# Patient Record
Sex: MALE | Race: WHITE | NOT HISPANIC OR LATINO
[De-identification: names, ages, dates, MRNs, and addresses within clinical notes are randomized per-mention and may not be internally consistent; named-entity substitution may affect disease eponyms.]

---

## 2016-01-08 RX ORDER — ATORVASTATIN CALCIUM 80 MG/1
1 TABLET, FILM COATED ORAL
Qty: 0 | Refills: 0 | COMMUNITY
Start: 2016-01-08

## 2018-07-24 ENCOUNTER — APPOINTMENT (OUTPATIENT)
Dept: ORTHOPEDIC SURGERY | Facility: CLINIC | Age: 83
End: 2018-07-24
Payer: COMMERCIAL

## 2018-07-24 VITALS
DIASTOLIC BLOOD PRESSURE: 79 MMHG | HEART RATE: 64 BPM | SYSTOLIC BLOOD PRESSURE: 159 MMHG | HEIGHT: 72 IN | WEIGHT: 206 LBS | BODY MASS INDEX: 27.9 KG/M2

## 2018-07-24 DIAGNOSIS — M17.11 UNILATERAL PRIMARY OSTEOARTHRITIS, RIGHT KNEE: ICD-10-CM

## 2018-07-24 DIAGNOSIS — M17.12 UNILATERAL PRIMARY OSTEOARTHRITIS, LEFT KNEE: ICD-10-CM

## 2018-07-24 PROCEDURE — 73564 X-RAY EXAM KNEE 4 OR MORE: CPT | Mod: 50

## 2018-07-24 PROCEDURE — 99204 OFFICE O/P NEW MOD 45 MIN: CPT

## 2018-08-01 ENCOUNTER — OUTPATIENT (OUTPATIENT)
Dept: OUTPATIENT SERVICES | Facility: HOSPITAL | Age: 83
LOS: 1 days | End: 2018-08-01
Payer: COMMERCIAL

## 2018-08-01 VITALS
WEIGHT: 200.18 LBS | TEMPERATURE: 98 F | OXYGEN SATURATION: 100 % | HEART RATE: 45 BPM | RESPIRATION RATE: 16 BRPM | DIASTOLIC BLOOD PRESSURE: 62 MMHG | SYSTOLIC BLOOD PRESSURE: 162 MMHG | HEIGHT: 70.75 IN

## 2018-08-01 DIAGNOSIS — Z95.5 PRESENCE OF CORONARY ANGIOPLASTY IMPLANT AND GRAFT: Chronic | ICD-10-CM

## 2018-08-01 DIAGNOSIS — Z98.42 CATARACT EXTRACTION STATUS, LEFT EYE: Chronic | ICD-10-CM

## 2018-08-01 DIAGNOSIS — Z98.890 OTHER SPECIFIED POSTPROCEDURAL STATES: Chronic | ICD-10-CM

## 2018-08-01 DIAGNOSIS — I25.10 ATHEROSCLEROTIC HEART DISEASE OF NATIVE CORONARY ARTERY WITHOUT ANGINA PECTORIS: ICD-10-CM

## 2018-08-01 DIAGNOSIS — M19.90 UNSPECIFIED OSTEOARTHRITIS, UNSPECIFIED SITE: ICD-10-CM

## 2018-08-01 DIAGNOSIS — M17.12 UNILATERAL PRIMARY OSTEOARTHRITIS, LEFT KNEE: ICD-10-CM

## 2018-08-01 DIAGNOSIS — Z98.41 CATARACT EXTRACTION STATUS, RIGHT EYE: Chronic | ICD-10-CM

## 2018-08-01 DIAGNOSIS — Z01.818 ENCOUNTER FOR OTHER PREPROCEDURAL EXAMINATION: ICD-10-CM

## 2018-08-01 LAB
ALBUMIN SERPL ELPH-MCNC: 3.8 G/DL — SIGNIFICANT CHANGE UP (ref 3.3–5)
ALP SERPL-CCNC: 67 U/L — SIGNIFICANT CHANGE UP (ref 30–120)
ALT FLD-CCNC: 19 U/L DA — SIGNIFICANT CHANGE UP (ref 10–60)
ANION GAP SERPL CALC-SCNC: 8 MMOL/L — SIGNIFICANT CHANGE UP (ref 5–17)
APTT BLD: 35 SEC — SIGNIFICANT CHANGE UP (ref 27.5–37.4)
AST SERPL-CCNC: 22 U/L — SIGNIFICANT CHANGE UP (ref 10–40)
BILIRUB SERPL-MCNC: 0.6 MG/DL — SIGNIFICANT CHANGE UP (ref 0.2–1.2)
BLD GP AB SCN SERPL QL: SIGNIFICANT CHANGE UP
BUN SERPL-MCNC: 25 MG/DL — HIGH (ref 7–23)
CALCIUM SERPL-MCNC: 9.2 MG/DL — SIGNIFICANT CHANGE UP (ref 8.4–10.5)
CHLORIDE SERPL-SCNC: 104 MMOL/L — SIGNIFICANT CHANGE UP (ref 96–108)
CO2 SERPL-SCNC: 28 MMOL/L — SIGNIFICANT CHANGE UP (ref 22–31)
CREAT SERPL-MCNC: 1.64 MG/DL — HIGH (ref 0.5–1.3)
GLUCOSE SERPL-MCNC: 98 MG/DL — SIGNIFICANT CHANGE UP (ref 70–99)
HCT VFR BLD CALC: 31.9 % — LOW (ref 39–50)
HGB BLD-MCNC: 11.1 G/DL — LOW (ref 13–17)
INR BLD: 1.09 RATIO — SIGNIFICANT CHANGE UP (ref 0.88–1.16)
MCHC RBC-ENTMCNC: 33 PG — SIGNIFICANT CHANGE UP (ref 27–34)
MCHC RBC-ENTMCNC: 34.8 GM/DL — SIGNIFICANT CHANGE UP (ref 32–36)
MCV RBC AUTO: 94.9 FL — SIGNIFICANT CHANGE UP (ref 80–100)
NRBC # BLD: 0 /100 WBCS — SIGNIFICANT CHANGE UP (ref 0–0)
PLATELET # BLD AUTO: 130 K/UL — LOW (ref 150–400)
POTASSIUM SERPL-MCNC: 4.2 MMOL/L — SIGNIFICANT CHANGE UP (ref 3.5–5.3)
POTASSIUM SERPL-SCNC: 4.2 MMOL/L — SIGNIFICANT CHANGE UP (ref 3.5–5.3)
PROT SERPL-MCNC: 7.4 G/DL — SIGNIFICANT CHANGE UP (ref 6–8.3)
PROTHROM AB SERPL-ACNC: 11.9 SEC — SIGNIFICANT CHANGE UP (ref 9.8–12.7)
RBC # BLD: 3.36 M/UL — LOW (ref 4.2–5.8)
RBC # FLD: 12.8 % — SIGNIFICANT CHANGE UP (ref 10.3–14.5)
SODIUM SERPL-SCNC: 140 MMOL/L — SIGNIFICANT CHANGE UP (ref 135–145)
WBC # BLD: 7.21 K/UL — SIGNIFICANT CHANGE UP (ref 3.8–10.5)
WBC # FLD AUTO: 7.21 K/UL — SIGNIFICANT CHANGE UP (ref 3.8–10.5)

## 2018-08-01 PROCEDURE — 93010 ELECTROCARDIOGRAM REPORT: CPT | Mod: NC

## 2018-08-01 RX ORDER — ASPIRIN/CALCIUM CARB/MAGNESIUM 324 MG
1 TABLET ORAL
Qty: 0 | Refills: 0 | COMMUNITY

## 2018-08-01 NOTE — H&P PST ADULT - NSANTHOSAYNRD_GEN_A_CORE
No. HEATHER screening performed.  STOP BANG Legend: 0-2 = LOW Risk; 3-4 = INTERMEDIATE Risk; 5-8 = HIGH Risk/neck No. HEATHER screening performed.  STOP BANG Legend: 0-2 = LOW Risk; 3-4 = INTERMEDIATE Risk; 5-8 = HIGH Risk/neck 15 inches

## 2018-08-01 NOTE — H&P PST ADULT - PMH
BPH (benign prostatic hyperplasia)    CAD (coronary artery disease)  slight MI x2 as per pt  Essential hypertension    Glaucoma  right eye, no eye drops, only sees minimal shadows in the eye  Hyperlipidemia, unspecified hyperlipidemia    Legally blind in right eye, as defined in [place]    Nocturia  x1  Osteoarthritis    Paroxysmal atrial fibrillation    TIA (transient ischemic attack)  x2 2008 ansd 2011 no residual deficits  Urinary frequency

## 2018-08-01 NOTE — H&P PST ADULT - FAMILY HISTORY
Father  Still living? No  Family history of MI (myocardial infarction), Age at diagnosis: Age Unknown     Mother  Still living? Unknown  Family history of liver cancer, Age at diagnosis: Age Unknown     Sibling  Still living? No  Family history of osteoarthritis, Age at diagnosis: Age Unknown

## 2018-08-01 NOTE — H&P PST ADULT - EXTREMITIES COMMENTS
bilateral lower leg trace swelling right > left, bilateral lower extremity spider veins. calves nontender, faint pedal pulses palpable

## 2018-08-01 NOTE — H&P PST ADULT - HISTORY OF PRESENT ILLNESS
87 yo male presents with long history, 7-8 year history of left knee pain. Pain was treated conservatively with tylenol and NSAIDs. Denies ever receiving PT or any injections into the knee. Pain currently 0-2/10 at rest and and 4/10 with daily activity. Pain at its worst with prolonged standing. Pain mildly relieved with stretching. Denies using any other pain relief measures. Also reports intermittent swelling in the knee

## 2018-08-01 NOTE — H&P PST ADULT - PSH
S/P cataract surgery, right  2010  S/P colonoscopy with polypectomy  2013, benign polyps  S/P coronary artery stent placement  x1 in 2011 S/P cataract surgery, left  2010  S/P cataract surgery, right  2010  S/P colonoscopy with polypectomy  2013, benign polyps  S/P coronary artery stent placement  x1 in 2011

## 2018-08-01 NOTE — H&P PST ADULT - RS GEN PE MLT RESP DETAILS PC
good air movement/airway patent/respirations non-labored/no rales/no rhonchi/no wheezes/breath sounds equal

## 2018-08-01 NOTE — H&P PST ADULT - MUSCULOSKELETAL
details… detailed exam decreased ROM due to pain/diminished strength/no joint erythema/no joint warmth/no joint swelling/no calf tenderness/decreased ROM/right knee

## 2018-08-01 NOTE — H&P PST ADULT - PROBLEM SELECTOR PLAN 1
right TKR. medical clearance requested. toprol and doxazosin AM of surgery with sips of water. pepcid and surgical wash instructions reviewed and verbalized understanding

## 2018-08-02 LAB
MRSA PCR RESULT.: SIGNIFICANT CHANGE UP
S AUREUS DNA NOSE QL NAA+PROBE: SIGNIFICANT CHANGE UP

## 2018-08-13 PROBLEM — M19.90 UNSPECIFIED OSTEOARTHRITIS, UNSPECIFIED SITE: Chronic | Status: ACTIVE | Noted: 2018-08-01

## 2018-08-13 PROBLEM — R35.1 NOCTURIA: Chronic | Status: ACTIVE | Noted: 2018-08-01

## 2018-08-13 PROBLEM — H54.8 LEGAL BLINDNESS, AS DEFINED IN USA: Chronic | Status: ACTIVE | Noted: 2018-08-01

## 2018-08-13 PROBLEM — N40.0 BENIGN PROSTATIC HYPERPLASIA WITHOUT LOWER URINARY TRACT SYMPTOMS: Chronic | Status: ACTIVE | Noted: 2018-08-01

## 2018-08-13 PROBLEM — R35.0 FREQUENCY OF MICTURITION: Chronic | Status: ACTIVE | Noted: 2018-08-01

## 2018-08-16 ENCOUNTER — TRANSCRIPTION ENCOUNTER (OUTPATIENT)
Age: 83
End: 2018-08-16

## 2018-08-16 RX ORDER — SENNA PLUS 8.6 MG/1
2 TABLET ORAL AT BEDTIME
Qty: 0 | Refills: 0 | Status: DISCONTINUED | OUTPATIENT
Start: 2018-08-17 | End: 2018-08-22

## 2018-08-16 RX ORDER — POLYETHYLENE GLYCOL 3350 17 G/17G
17 POWDER, FOR SOLUTION ORAL DAILY
Qty: 0 | Refills: 0 | Status: DISCONTINUED | OUTPATIENT
Start: 2018-08-17 | End: 2018-08-22

## 2018-08-16 RX ORDER — SODIUM CHLORIDE 9 MG/ML
1000 INJECTION, SOLUTION INTRAVENOUS
Qty: 0 | Refills: 0 | Status: DISCONTINUED | OUTPATIENT
Start: 2018-08-17 | End: 2018-08-22

## 2018-08-16 RX ORDER — ONDANSETRON 8 MG/1
4 TABLET, FILM COATED ORAL EVERY 6 HOURS
Qty: 0 | Refills: 0 | Status: DISCONTINUED | OUTPATIENT
Start: 2018-08-17 | End: 2018-08-22

## 2018-08-16 RX ORDER — PANTOPRAZOLE SODIUM 20 MG/1
40 TABLET, DELAYED RELEASE ORAL DAILY
Qty: 0 | Refills: 0 | Status: DISCONTINUED | OUTPATIENT
Start: 2018-08-17 | End: 2018-08-22

## 2018-08-16 RX ORDER — DOCUSATE SODIUM 100 MG
100 CAPSULE ORAL THREE TIMES A DAY
Qty: 0 | Refills: 0 | Status: DISCONTINUED | OUTPATIENT
Start: 2018-08-17 | End: 2018-08-22

## 2018-08-16 RX ORDER — MAGNESIUM HYDROXIDE 400 MG/1
30 TABLET, CHEWABLE ORAL DAILY
Qty: 0 | Refills: 0 | Status: DISCONTINUED | OUTPATIENT
Start: 2018-08-17 | End: 2018-08-22

## 2018-08-16 RX ORDER — FAMOTIDINE 10 MG/ML
1 INJECTION INTRAVENOUS
Qty: 0 | Refills: 0 | COMMUNITY
Start: 2018-08-16 | End: 2018-08-17

## 2018-08-16 NOTE — PROGRESS NOTE ADULT - SUBJECTIVE AND OBJECTIVE BOX
Admission medication reconciliation/Presurgical evaluation:    NKDA    Home Medications:   · 	apixaban 2.5 mg 2 times a day  · 	Atorvastatin (Lipitor) 20 mg once a day (in the evening)  · 	aspirin 81 mg once a day (at bedtime)  · 	doxazosin 1 mg once a day  · 	Olmesartan-HCTZ (Benicar HCT) 40 mg-25 mg once a day (in the evening)  · 	Metoprolol succinate (Toprol-XL) 50 mg once a day     Past Medical History:  BPH (benign prostatic hyperplasia)    CAD (coronary artery disease)  slight MI x2 as per pt  Essential hypertension    Glaucoma  right eye, no eye drops, only sees minimal shadows in the eye  Hyperlipidemia, unspecified hyperlipidemia    Legally blind in right eye    Nocturia  x1  Osteoarthritis    Paroxysmal atrial fibrillation    TIA (transient ischemic attack)  x2 2008 and 2011 no residual deficits  Urinary frequency     Past Surgical History:  S/P cataract surgery, left  2010  S/P cataract surgery, right  2010  S/P colonoscopy with polypectomy  2013, benign polyps  S/P coronary artery stent placement  x1 in 2011    PST values of interest:  SCr 1.64mg/dL (­)  LFTs WNL    Presurgical evaluation:  1.	Topical TXA  2.	Acetaminophen for pain management. No NSAIDs (CKD)  3.	VTE prophylaxis: resume patient’s home medications of Eliquis 2.5mg q12h (AF, but age >80 and SCr = 1.5) and ASA EC 81mg Qday beginning POD1.

## 2018-08-17 ENCOUNTER — INPATIENT (INPATIENT)
Facility: HOSPITAL | Age: 83
LOS: 4 days | Discharge: NURSING, REHAB OR LONG-TERM CARE | DRG: 470 | End: 2018-08-22
Attending: ORTHOPAEDIC SURGERY | Admitting: ORTHOPAEDIC SURGERY
Payer: COMMERCIAL

## 2018-08-17 ENCOUNTER — RESULT REVIEW (OUTPATIENT)
Age: 83
End: 2018-08-17

## 2018-08-17 ENCOUNTER — APPOINTMENT (OUTPATIENT)
Dept: ORTHOPEDIC SURGERY | Facility: HOSPITAL | Age: 83
End: 2018-08-17

## 2018-08-17 VITALS
OXYGEN SATURATION: 97 % | SYSTOLIC BLOOD PRESSURE: 136 MMHG | WEIGHT: 199.74 LBS | DIASTOLIC BLOOD PRESSURE: 80 MMHG | HEIGHT: 72 IN | HEART RATE: 53 BPM | TEMPERATURE: 98 F | RESPIRATION RATE: 12 BRPM

## 2018-08-17 DIAGNOSIS — Z98.41 CATARACT EXTRACTION STATUS, RIGHT EYE: Chronic | ICD-10-CM

## 2018-08-17 DIAGNOSIS — Z95.5 PRESENCE OF CORONARY ANGIOPLASTY IMPLANT AND GRAFT: Chronic | ICD-10-CM

## 2018-08-17 DIAGNOSIS — Z98.890 OTHER SPECIFIED POSTPROCEDURAL STATES: Chronic | ICD-10-CM

## 2018-08-17 DIAGNOSIS — M17.12 UNILATERAL PRIMARY OSTEOARTHRITIS, LEFT KNEE: ICD-10-CM

## 2018-08-17 DIAGNOSIS — Z98.42 CATARACT EXTRACTION STATUS, LEFT EYE: Chronic | ICD-10-CM

## 2018-08-17 DIAGNOSIS — Z01.818 ENCOUNTER FOR OTHER PREPROCEDURAL EXAMINATION: ICD-10-CM

## 2018-08-17 LAB
ABO RH CONFIRMATION: SIGNIFICANT CHANGE UP
ANION GAP SERPL CALC-SCNC: 6 MMOL/L — SIGNIFICANT CHANGE UP (ref 5–17)
BUN SERPL-MCNC: 28 MG/DL — HIGH (ref 7–23)
CALCIUM SERPL-MCNC: 8.6 MG/DL — SIGNIFICANT CHANGE UP (ref 8.4–10.5)
CHLORIDE SERPL-SCNC: 105 MMOL/L — SIGNIFICANT CHANGE UP (ref 96–108)
CO2 SERPL-SCNC: 26 MMOL/L — SIGNIFICANT CHANGE UP (ref 22–31)
CREAT SERPL-MCNC: 1.59 MG/DL — HIGH (ref 0.5–1.3)
GLUCOSE SERPL-MCNC: 126 MG/DL — HIGH (ref 70–99)
HCT VFR BLD CALC: 29 % — LOW (ref 39–50)
HGB BLD-MCNC: 9.9 G/DL — LOW (ref 13–17)
POTASSIUM SERPL-MCNC: 4.3 MMOL/L — SIGNIFICANT CHANGE UP (ref 3.5–5.3)
POTASSIUM SERPL-SCNC: 4.3 MMOL/L — SIGNIFICANT CHANGE UP (ref 3.5–5.3)
SODIUM SERPL-SCNC: 137 MMOL/L — SIGNIFICANT CHANGE UP (ref 135–145)

## 2018-08-17 PROCEDURE — 88311 DECALCIFY TISSUE: CPT | Mod: 26

## 2018-08-17 PROCEDURE — 99223 1ST HOSP IP/OBS HIGH 75: CPT

## 2018-08-17 PROCEDURE — 88305 TISSUE EXAM BY PATHOLOGIST: CPT | Mod: 26

## 2018-08-17 PROCEDURE — 93010 ELECTROCARDIOGRAM REPORT: CPT

## 2018-08-17 PROCEDURE — 73562 X-RAY EXAM OF KNEE 3: CPT | Mod: 26,LT

## 2018-08-17 PROCEDURE — 12345: CPT | Mod: NC

## 2018-08-17 RX ORDER — DOXAZOSIN MESYLATE 4 MG
1 TABLET ORAL
Qty: 0 | Refills: 0 | COMMUNITY

## 2018-08-17 RX ORDER — TRANEXAMIC ACID 100 MG/ML
1 INJECTION, SOLUTION INTRAVENOUS ONCE
Qty: 0 | Refills: 0 | Status: COMPLETED | OUTPATIENT
Start: 2018-08-17 | End: 2018-08-17

## 2018-08-17 RX ORDER — CEFAZOLIN SODIUM 1 G
2000 VIAL (EA) INJECTION ONCE
Qty: 0 | Refills: 0 | Status: COMPLETED | OUTPATIENT
Start: 2018-08-17 | End: 2018-08-17

## 2018-08-17 RX ORDER — ACETAMINOPHEN 500 MG
1000 TABLET ORAL ONCE
Qty: 0 | Refills: 0 | Status: COMPLETED | OUTPATIENT
Start: 2018-08-17 | End: 2018-08-17

## 2018-08-17 RX ORDER — ATORVASTATIN CALCIUM 80 MG/1
20 TABLET, FILM COATED ORAL AT BEDTIME
Qty: 0 | Refills: 0 | Status: DISCONTINUED | OUTPATIENT
Start: 2018-08-17 | End: 2018-08-22

## 2018-08-17 RX ORDER — ASPIRIN/CALCIUM CARB/MAGNESIUM 324 MG
1 TABLET ORAL
Qty: 0 | Refills: 0 | COMMUNITY

## 2018-08-17 RX ORDER — ASPIRIN/CALCIUM CARB/MAGNESIUM 324 MG
81 TABLET ORAL AT BEDTIME
Qty: 0 | Refills: 0 | Status: DISCONTINUED | OUTPATIENT
Start: 2018-08-18 | End: 2018-08-22

## 2018-08-17 RX ORDER — ACETAMINOPHEN 500 MG
1000 TABLET ORAL EVERY 8 HOURS
Qty: 0 | Refills: 0 | Status: COMPLETED | OUTPATIENT
Start: 2018-08-17 | End: 2018-08-18

## 2018-08-17 RX ORDER — CHLORHEXIDINE GLUCONATE 213 G/1000ML
1 SOLUTION TOPICAL ONCE
Qty: 0 | Refills: 0 | Status: COMPLETED | OUTPATIENT
Start: 2018-08-17 | End: 2018-08-17

## 2018-08-17 RX ORDER — APIXABAN 2.5 MG/1
2.5 TABLET, FILM COATED ORAL
Qty: 0 | Refills: 0 | Status: COMPLETED | OUTPATIENT
Start: 2018-08-18 | End: 2018-08-18

## 2018-08-17 RX ORDER — ACETAMINOPHEN 500 MG
1000 TABLET ORAL EVERY 8 HOURS
Qty: 0 | Refills: 0 | Status: DISCONTINUED | OUTPATIENT
Start: 2018-08-18 | End: 2018-08-22

## 2018-08-17 RX ORDER — FENTANYL CITRATE 50 UG/ML
25 INJECTION INTRAVENOUS
Qty: 0 | Refills: 0 | Status: DISCONTINUED | OUTPATIENT
Start: 2018-08-17 | End: 2018-08-17

## 2018-08-17 RX ORDER — APREPITANT 80 MG/1
40 CAPSULE ORAL ONCE
Qty: 0 | Refills: 0 | Status: COMPLETED | OUTPATIENT
Start: 2018-08-17 | End: 2018-08-17

## 2018-08-17 RX ORDER — OXYCODONE HYDROCHLORIDE 5 MG/1
5 TABLET ORAL ONCE
Qty: 0 | Refills: 0 | Status: DISCONTINUED | OUTPATIENT
Start: 2018-08-17 | End: 2018-08-17

## 2018-08-17 RX ORDER — SODIUM CHLORIDE 9 MG/ML
1000 INJECTION, SOLUTION INTRAVENOUS
Qty: 0 | Refills: 0 | Status: DISCONTINUED | OUTPATIENT
Start: 2018-08-17 | End: 2018-08-17

## 2018-08-17 RX ORDER — OXYCODONE HYDROCHLORIDE 5 MG/1
10 TABLET ORAL
Qty: 0 | Refills: 0 | Status: DISCONTINUED | OUTPATIENT
Start: 2018-08-17 | End: 2018-08-22

## 2018-08-17 RX ORDER — APIXABAN 2.5 MG/1
2.5 TABLET, FILM COATED ORAL EVERY 12 HOURS
Qty: 0 | Refills: 0 | Status: DISCONTINUED | OUTPATIENT
Start: 2018-08-19 | End: 2018-08-22

## 2018-08-17 RX ORDER — CEFAZOLIN SODIUM 1 G
2000 VIAL (EA) INJECTION EVERY 8 HOURS
Qty: 0 | Refills: 0 | Status: COMPLETED | OUTPATIENT
Start: 2018-08-17 | End: 2018-08-18

## 2018-08-17 RX ORDER — DOXAZOSIN MESYLATE 4 MG
1 TABLET ORAL AT BEDTIME
Qty: 0 | Refills: 0 | Status: DISCONTINUED | OUTPATIENT
Start: 2018-08-17 | End: 2018-08-22

## 2018-08-17 RX ORDER — OXYCODONE HYDROCHLORIDE 5 MG/1
5 TABLET ORAL
Qty: 0 | Refills: 0 | Status: DISCONTINUED | OUTPATIENT
Start: 2018-08-17 | End: 2018-08-22

## 2018-08-17 RX ORDER — LOSARTAN POTASSIUM 100 MG/1
100 TABLET, FILM COATED ORAL DAILY
Qty: 0 | Refills: 0 | Status: DISCONTINUED | OUTPATIENT
Start: 2018-08-19 | End: 2018-08-19

## 2018-08-17 RX ORDER — METOPROLOL TARTRATE 50 MG
50 TABLET ORAL DAILY
Qty: 0 | Refills: 0 | Status: DISCONTINUED | OUTPATIENT
Start: 2018-08-17 | End: 2018-08-22

## 2018-08-17 RX ORDER — ONDANSETRON 8 MG/1
4 TABLET, FILM COATED ORAL ONCE
Qty: 0 | Refills: 0 | Status: DISCONTINUED | OUTPATIENT
Start: 2018-08-17 | End: 2018-08-17

## 2018-08-17 RX ORDER — HYDROMORPHONE HYDROCHLORIDE 2 MG/ML
0.5 INJECTION INTRAMUSCULAR; INTRAVENOUS; SUBCUTANEOUS
Qty: 0 | Refills: 0 | Status: DISCONTINUED | OUTPATIENT
Start: 2018-08-17 | End: 2018-08-22

## 2018-08-17 RX ADMIN — APREPITANT 40 MILLIGRAM(S): 80 CAPSULE ORAL at 09:14

## 2018-08-17 RX ADMIN — ATORVASTATIN CALCIUM 20 MILLIGRAM(S): 80 TABLET, FILM COATED ORAL at 21:26

## 2018-08-17 RX ADMIN — Medication 400 MILLIGRAM(S): at 18:52

## 2018-08-17 RX ADMIN — Medication 100 MILLIGRAM(S): at 19:30

## 2018-08-17 RX ADMIN — SENNA PLUS 2 TABLET(S): 8.6 TABLET ORAL at 21:26

## 2018-08-17 RX ADMIN — Medication 100 MILLIGRAM(S): at 21:26

## 2018-08-17 RX ADMIN — SODIUM CHLORIDE 60 MILLILITER(S): 9 INJECTION, SOLUTION INTRAVENOUS at 17:15

## 2018-08-17 RX ADMIN — CHLORHEXIDINE GLUCONATE 1 APPLICATION(S): 213 SOLUTION TOPICAL at 09:14

## 2018-08-17 RX ADMIN — Medication 1 MILLIGRAM(S): at 21:26

## 2018-08-17 NOTE — CONSULT NOTE ADULT - ASSESSMENT
87 yo male with OA, BPH, CAD, Paroxysmal Afib,  presents with long history, 7-8 year history of left knee pain.    S/p left TKR, stable cont pain control with IV Dilaudid and po Oxycodone prn , cont PT and DVT ppx with Eliquis    HTN ,controlled cont metoprolol with hold parameter and Losartan at 8/19.    BPH cont cardura and monitor urine output, high risk for post op urinary retention.     CAD stable ,cont ASA and metoprolol and statin.     PAF , stable cont metoprolol and Eliquis.    HLD cont statin 87 yo male with OA, BPH, CAD, Paroxysmal Afib,  presents with long history, 7-8 year history of left knee pain.    S/p left TKR, stable cont pain control with IV Dilaudid and po Oxycodone prn , cont PT and DVT ppx with Eliquis    HTN ,controlled cont metoprolol with hold parameter and Losartan at 8/19.    BPH cont cardura and monitor urine output, high risk for post op urinary retention.     CAD stable ,cont ASA and metoprolol and statin.     PAF , stable cont metoprolol and Eliquis.    HLD cont statin    Hx of TIA cont ASA, Statin.

## 2018-08-17 NOTE — PHYSICAL THERAPY INITIAL EVALUATION ADULT - IMPAIRED TRANSFERS: SIT/STAND, REHAB EVAL
Pt with decreased ability to flex right knee which is the non-operated leg/decreased strength/impaired balance

## 2018-08-17 NOTE — ASU PATIENT PROFILE, ADULT - PSH
S/P cataract surgery, left  2010  S/P cataract surgery, right  2010  S/P colonoscopy with polypectomy  2013, benign polyps  S/P coronary artery stent placement  x1 in 2011

## 2018-08-17 NOTE — BRIEF OPERATIVE NOTE - PROCEDURE
<<-----Click on this checkbox to enter Procedure Knee replacement, total, left  08/17/2018    Active  [id number]

## 2018-08-17 NOTE — PHYSICAL THERAPY INITIAL EVALUATION ADULT - RANGE OF MOTION EXAMINATION, REHAB EVAL
left knee flex ~50 degrees; right knee flex ~90 degrees/deficits as listed below/bilateral upper extremity ROM was WFL (within functional limits)

## 2018-08-17 NOTE — PROGRESS NOTE ADULT - SUBJECTIVE AND OBJECTIVE BOX
Orthopaedic Post Op Note    Procedure: Left TKR  Surgeon: Que Burgos    86y Male comfortable, without complaints. Reported pain score = 0  Denies N/V, CP, SOB, numbness/tingling of extremities.    PE:  Vital Signs Last 24 Hrs  T(C): 37.1 (17 Aug 2018 19:45), Max: 37.1 (17 Aug 2018 19:45)  T(F): 98.7 (17 Aug 2018 19:45), Max: 98.7 (17 Aug 2018 19:45)  HR: 72 (17 Aug 2018 19:45) (50 - 72)  BP: 128/71 (17 Aug 2018 19:45) (101/38 - 143/62)  RR: 17 (17 Aug 2018 19:45) (10 - 18)  SpO2: 100% (17 Aug 2018 19:45) (96% - 100%)  General: Pt alert and oriented   Lungs: + BS CTA bilaterally  Heart: +S1 & S2 heard, RRR  Abd: + BS heard, soft, NT, ND  Left Knee Dressing: C/D/I, functioning hemovac in place  Bilateral LEs:  Motor:   5/5 dorsiflexion, plantarflexion, EHL  Sensation intact to LT   2+ PT Pulses    Post Op labs:  17 Aug 2018 16:38    137    |  105    |  28     ----------------------------<  126    4.3     |  26     |  1.59     Ca    8.6        17 Aug 2018 16:38                            9.9    x     )-----------( x        ( 17 Aug 2018 16:38 )             29.0       A/P: 86y Male POD#0 s/p Left TKR  - Stable  - Acetaminophen, dilaudid/oxycodone for Pain Control   - DVT ppx: Eliquis and Aspirin  - Michaela op IV abx: Ancef  - PT, OT per protocol  - F/U AM Labs  DCP = home Monday

## 2018-08-17 NOTE — PHYSICAL THERAPY INITIAL EVALUATION ADULT - ADDITIONAL COMMENTS
normal...
Lives in house with spouse.  4 stairs to enter with railing; 10 stairs to bedroom with railing but can stay on 1st level.  Pt. has stall shower with curtains and grab bars.  Owns cane.

## 2018-08-18 LAB
ANION GAP SERPL CALC-SCNC: 7 MMOL/L — SIGNIFICANT CHANGE UP (ref 5–17)
BUN SERPL-MCNC: 32 MG/DL — HIGH (ref 7–23)
CALCIUM SERPL-MCNC: 8.2 MG/DL — LOW (ref 8.4–10.5)
CHLORIDE SERPL-SCNC: 104 MMOL/L — SIGNIFICANT CHANGE UP (ref 96–108)
CO2 SERPL-SCNC: 24 MMOL/L — SIGNIFICANT CHANGE UP (ref 22–31)
CREAT SERPL-MCNC: 1.61 MG/DL — HIGH (ref 0.5–1.3)
GLUCOSE BLDC GLUCOMTR-MCNC: 253 MG/DL — HIGH (ref 70–99)
GLUCOSE SERPL-MCNC: 170 MG/DL — HIGH (ref 70–99)
HCT VFR BLD CALC: 21.8 % — LOW (ref 39–50)
HCT VFR BLD CALC: 25.3 % — LOW (ref 39–50)
HGB BLD-MCNC: 7.6 G/DL — LOW (ref 13–17)
HGB BLD-MCNC: 8.8 G/DL — LOW (ref 13–17)
MCHC RBC-ENTMCNC: 33.3 PG — SIGNIFICANT CHANGE UP (ref 27–34)
MCHC RBC-ENTMCNC: 33.7 PG — SIGNIFICANT CHANGE UP (ref 27–34)
MCHC RBC-ENTMCNC: 34.8 GM/DL — SIGNIFICANT CHANGE UP (ref 32–36)
MCHC RBC-ENTMCNC: 34.9 GM/DL — SIGNIFICANT CHANGE UP (ref 32–36)
MCV RBC AUTO: 95.6 FL — SIGNIFICANT CHANGE UP (ref 80–100)
MCV RBC AUTO: 96.9 FL — SIGNIFICANT CHANGE UP (ref 80–100)
NRBC # BLD: 0 /100 WBCS — SIGNIFICANT CHANGE UP (ref 0–0)
NRBC # BLD: 0 /100 WBCS — SIGNIFICANT CHANGE UP (ref 0–0)
PLATELET # BLD AUTO: 118 K/UL — LOW (ref 150–400)
PLATELET # BLD AUTO: 128 K/UL — LOW (ref 150–400)
POTASSIUM SERPL-MCNC: 4.5 MMOL/L — SIGNIFICANT CHANGE UP (ref 3.5–5.3)
POTASSIUM SERPL-SCNC: 4.5 MMOL/L — SIGNIFICANT CHANGE UP (ref 3.5–5.3)
RBC # BLD: 2.28 M/UL — LOW (ref 4.2–5.8)
RBC # BLD: 2.61 M/UL — LOW (ref 4.2–5.8)
RBC # FLD: 12.9 % — SIGNIFICANT CHANGE UP (ref 10.3–14.5)
RBC # FLD: 12.9 % — SIGNIFICANT CHANGE UP (ref 10.3–14.5)
SODIUM SERPL-SCNC: 135 MMOL/L — SIGNIFICANT CHANGE UP (ref 135–145)
WBC # BLD: 12.86 K/UL — HIGH (ref 3.8–10.5)
WBC # BLD: 9.56 K/UL — SIGNIFICANT CHANGE UP (ref 3.8–10.5)
WBC # FLD AUTO: 12.86 K/UL — HIGH (ref 3.8–10.5)
WBC # FLD AUTO: 9.56 K/UL — SIGNIFICANT CHANGE UP (ref 3.8–10.5)

## 2018-08-18 PROCEDURE — 12345: CPT | Mod: NC

## 2018-08-18 PROCEDURE — 99233 SBSQ HOSP IP/OBS HIGH 50: CPT

## 2018-08-18 RX ORDER — SODIUM CHLORIDE 9 MG/ML
1000 INJECTION INTRAMUSCULAR; INTRAVENOUS; SUBCUTANEOUS ONCE
Qty: 0 | Refills: 0 | Status: COMPLETED | OUTPATIENT
Start: 2018-08-18 | End: 2018-08-18

## 2018-08-18 RX ORDER — SODIUM CHLORIDE 9 MG/ML
1000 INJECTION INTRAMUSCULAR; INTRAVENOUS; SUBCUTANEOUS ONCE
Qty: 0 | Refills: 0 | Status: DISCONTINUED | OUTPATIENT
Start: 2018-08-18 | End: 2018-08-18

## 2018-08-18 RX ADMIN — SODIUM CHLORIDE 75 MILLILITER(S): 9 INJECTION, SOLUTION INTRAVENOUS at 03:06

## 2018-08-18 RX ADMIN — Medication 1000 MILLIGRAM(S): at 12:43

## 2018-08-18 RX ADMIN — Medication 1 MILLIGRAM(S): at 21:36

## 2018-08-18 RX ADMIN — APIXABAN 2.5 MILLIGRAM(S): 2.5 TABLET, FILM COATED ORAL at 22:53

## 2018-08-18 RX ADMIN — Medication 81 MILLIGRAM(S): at 21:36

## 2018-08-18 RX ADMIN — Medication 100 MILLIGRAM(S): at 03:31

## 2018-08-18 RX ADMIN — APIXABAN 2.5 MILLIGRAM(S): 2.5 TABLET, FILM COATED ORAL at 12:43

## 2018-08-18 RX ADMIN — Medication 1000 MILLIGRAM(S): at 21:42

## 2018-08-18 RX ADMIN — Medication 1000 MILLIGRAM(S): at 12:44

## 2018-08-18 RX ADMIN — SODIUM CHLORIDE 2000 MILLILITER(S): 9 INJECTION INTRAMUSCULAR; INTRAVENOUS; SUBCUTANEOUS at 01:54

## 2018-08-18 RX ADMIN — Medication 100 MILLIGRAM(S): at 05:48

## 2018-08-18 RX ADMIN — Medication 1000 MILLIGRAM(S): at 11:26

## 2018-08-18 RX ADMIN — ATORVASTATIN CALCIUM 20 MILLIGRAM(S): 80 TABLET, FILM COATED ORAL at 21:36

## 2018-08-18 RX ADMIN — Medication 400 MILLIGRAM(S): at 03:08

## 2018-08-18 RX ADMIN — PANTOPRAZOLE SODIUM 40 MILLIGRAM(S): 20 TABLET, DELAYED RELEASE ORAL at 12:43

## 2018-08-18 RX ADMIN — Medication 1000 MILLIGRAM(S): at 21:41

## 2018-08-18 RX ADMIN — SODIUM CHLORIDE 1000 MILLILITER(S): 9 INJECTION INTRAMUSCULAR; INTRAVENOUS; SUBCUTANEOUS at 03:00

## 2018-08-18 RX ADMIN — Medication 400 MILLIGRAM(S): at 11:26

## 2018-08-18 RX ADMIN — Medication 100 MILLIGRAM(S): at 21:42

## 2018-08-18 RX ADMIN — Medication 100 MILLIGRAM(S): at 12:43

## 2018-08-18 RX ADMIN — SENNA PLUS 2 TABLET(S): 8.6 TABLET ORAL at 21:42

## 2018-08-18 NOTE — PROGRESS NOTE ADULT - SUBJECTIVE AND OBJECTIVE BOX
Patient is a 86y old  Male who presents with a chief complaint of heart tremors (17 Aug 2018 19:50)      INTERVAL HPI/OVERNIGHT EVENTS: pt has drop in Hb/Hct , pt is asymptomatic     MEDICATIONS  (STANDING):  acetaminophen   Tablet. 1000 milliGRAM(s) Oral every 8 hours  apixaban 2.5 milliGRAM(s) Oral <User Schedule>  aspirin enteric coated 81 milliGRAM(s) Oral at bedtime  atorvastatin 20 milliGRAM(s) Oral at bedtime  docusate sodium 100 milliGRAM(s) Oral three times a day  doxazosin 1 milliGRAM(s) Oral at bedtime  lactated ringers. 1000 milliLiter(s) (75 mL/Hr) IV Continuous <Continuous>  metoprolol succinate ER 50 milliGRAM(s) Oral daily  pantoprazole    Tablet 40 milliGRAM(s) Oral daily  senna 2 Tablet(s) Oral at bedtime    MEDICATIONS  (PRN):  aluminum hydroxide/magnesium hydroxide/simethicone Suspension 30 milliLiter(s) Oral four times a day PRN Indigestion  HYDROmorphone  Injectable 0.5 milliGRAM(s) IV Push every 3 hours PRN Severe Pain (7 - 10)  magnesium hydroxide Suspension 30 milliLiter(s) Oral daily PRN Constipation  ondansetron Injectable 4 milliGRAM(s) IV Push every 6 hours PRN Nausea and/or Vomiting  oxyCODONE    IR 5 milliGRAM(s) Oral every 3 hours PRN Mild Pain (1 - 3)  oxyCODONE    IR 10 milliGRAM(s) Oral every 3 hours PRN Moderate Pain (4 - 6)  polyethylene glycol 3350 17 Gram(s) Oral daily PRN Constipation      Allergies    No Known Allergies    Intolerances        REVIEW OF SYSTEMS:  CONSTITUTIONAL: No fever or chills  HEENT:  No headache, no sore throat  RESPIRATORY: No cough, wheezing, or shortness of breath  CARDIOVASCULAR: No chest pain, palpitations, or leg swelling  GASTROINTESTINAL: No nausea, vomiting, or diarrhea  GENITOURINARY: No dysuria, frequency, or hematuria  NEUROLOGICAL: no focal weakness or dizziness  SKIN:  No rashes or lesions   MUSCULOSKELETAL: no myalgias   PSYCHIATRIC: No depression or anxiety  ROS Unable to obtain due to - [ ] Dementia  [ ] Lethargy  REST OF REVIEW Of SYSTEM - [ ] Normal     Vital Signs Last 24 Hrs  T(C): 36.5 (18 Aug 2018 11:24), Max: 37.1 (17 Aug 2018 19:45)  T(F): 97.7 (18 Aug 2018 11:24), Max: 98.7 (17 Aug 2018 19:45)  HR: 58 (18 Aug 2018 11:24) (47 - 72)  BP: 123/58 (18 Aug 2018 11:24) (95/59 - 155/62)  BP(mean): --  RR: 16 (18 Aug 2018 11:24) (10 - 18)  SpO2: 100% (18 Aug 2018 11:24) (96% - 100%)    PHYSICAL EXAM:  GENERAL: NAD  HEENT:  EOMI, mmm  CHEST/LUNG:  CTA b/l, no rales, wheezes, or rhonchi  HEART:  RRR, S1, S2, []murmur  ABDOMEN:  BS+, soft, NT, ND  EXTREMITIES: no edema or calf tenderness , dressing dry and intact.   NERVOUS SYSTEM: AA&Ox3 , no focal neurological deficit.     DIET:     Cultures:     LABS:                        7.6    9.56  )-----------( 118      ( 18 Aug 2018 06:42 )             21.8     CBC Full  -  ( 18 Aug 2018 06:42 )  WBC Count : 9.56 K/uL  Hemoglobin : 7.6 g/dL  Hematocrit : 21.8 %  Platelet Count - Automated : 118 K/uL  Mean Cell Volume : 95.6 fl  Mean Cell Hemoglobin : 33.3 pg  Mean Cell Hemoglobin Concentration : 34.9 gm/dL  Auto Neutrophil # : x  Auto Lymphocyte # : x  Auto Monocyte # : x  Auto Eosinophil # : x  Auto Basophil # : x  Auto Neutrophil % : x  Auto Lymphocyte % : x  Auto Monocyte % : x  Auto Eosinophil % : x  Auto Basophil % : x    18 Aug 2018 06:42    135    |  104    |  32     ----------------------------<  170    4.5     |  24     |  1.61     Ca    8.2        18 Aug 2018 06:42          CAPILLARY BLOOD GLUCOSE      POCT Blood Glucose.: 253 mg/dL (18 Aug 2018 00:50)          RADIOLOGY & ADDITIONAL TESTS:    Personally reviewed.     Consultant(s) Notes Reviewed:  [x] YES  [ ] NO    Care Discussed with [ ] Consultants  [x] Patient  [ ] Family  [x]      [ ] Other; RN  DVT ppx  Advanced directive

## 2018-08-18 NOTE — OCCUPATIONAL THERAPY INITIAL EVALUATION ADULT - ADDITIONAL COMMENTS
Lives with wife. 4 steps to enter with HR. 10 steps to basement but doesn't have to do initially.  Stall shower

## 2018-08-18 NOTE — PROGRESS NOTE ADULT - SUBJECTIVE AND OBJECTIVE BOX
ORTHOPEDIC PA PROGRESS NOTE  JOSEPH LUEVANO      86y Male                                                                                                                               POD # 1       STATUS POST:               Pre-Op Dx: Primary osteoarthritis of left knee    Post-Op Dx:  Primary osteoarthritis of left knee    Procedure: Knee replacement, total, left                                              Patient comfortable denies shortness of breath chest pain heart palpations dizziness constitutional symptoms ect.  His pain is well controlled with current pain meds.  He did have low urine ouput fluid bolus total of 2 liters was given patient was assymptomatic.  Bladder scan showed 700cc patient tried to stand and void and had vasovagal episode and rapid response called.  Menezes was placed he has history of BPH and nocturia.  He is comfortable in bed at this time.  Hemovac was clamped for four hours for 400cc output then reclamped at 1:30am after 150cc output.      Pain (0-10):   Current Pain Management:  [ ] PCA   [x ] Po Analgesics [x ] IM /IV Anagesics     T(F): 97.6  HR: 54  BP: 132/54  RR: 17  SpO2: 100%                        7.6    9.56  )-----------( 118      ( 18 Aug 2018 06:42 )             21.8                     08-18    135  |  104  |  32<H>  ----------------------------<  170<H>  4.5   |  24  |  1.61<H>    Ca    8.2<L>      18 Aug 2018 06:42    Hemovac out put  Physical Exam :    -   Surgical site clean and dry hemovac clamped in place.   -   Distal Neurvascular status intact grossly.   -   Warm well perfused; capillary refill <3 seconds   -   (+)EHL/FHL 5/5 dorsi/plantar flexion intact  -   (+) Sensation to light touch  -   (-) Calf tenderness Bilaterally    A/P: 86y Male s/p Primary osteoarthritis of left knee    -   Acute blood loss anemia with vasovagal episode yesterday.  Vitals stable in bed discussed with Medicine will transfuse 1 unit PRBC and monitor recheck cbc in am  -  Continue menezes monitory urine out put  -  Will unclamp hemovac and monitor output  -   Pain control   -   Medicine to follow  -   DVT ppx:     [x ]SCDs     [x ] ASA     [x ] Eliquis     [ ] Lovenox  -   Weight bearing status:  WBAT [x ]        PWB    [ ]     TTWB  [ ]      NWB  [ ]  -   Nurse made aware of plan   -  Dispo:     Home [ ]     Acute Rehab [ ]     [facility] [ ]     TBD [ x]

## 2018-08-18 NOTE — CHART NOTE - NSCHARTNOTEFT_GEN_A_CORE
RR was activate by RN for syncope. Patient was going to the bathroom when he fainted & passed out. Seen & examined at the bedside, awake, alert, and fully oriented, lying supine in bed without orthopnea, pale, no JVD, no leg edema on the right (left s/p TKR), Heart: normal S1 & S2, no murmurs or extra sounds, Lungs: CTA B/L, Abdomen: soft, NT, ND, BS (+), no suprapubic  tenderness or palpable masses. Patient is afebrile, HR 67/min, /62, RR 17/min, SPO2 99% on RA.  A/P: Orthostatic Syncope, patient had BP of 95/59 less than hour earlier with HR of 47, reported failure to void since came to the floor, NS initial bolus was then ordered, but patient got up from bed to go to bathroom prior to finishing the IVF initial bolus. Will continue volume repletion with monitoring vitals, orthostatic vitals after volume resuscitation, repeat H & H now as hemo vac was clamped during the day shift for excessive blood drainage, got 50 ml after being unclamped over one hour, now another 150 ml, will clamp again, to be followed up by ortho team. Explained to patient. RR was activate by RN for syncope. Patient was going to the bathroom when he fainted & passed out. Seen & examined at the bedside, awake, alert, and fully oriented, lying supine in bed without orthopnea, pale, no JVD, no leg edema on the right (left s/p TKR), Heart: normal S1 & S2, no murmurs or extra sounds, Lungs: CTA B/L, Abdomen: soft, NT, ND, BS (+), no suprapubic  tenderness or palpable masses. Patient is afebrile, HR 67/min, /62, RR 17/min, SPO2 99% on RA.  A/P: Orthostatic Syncope, patient had BP of 95/59 less than hour earlier with HR of 47, reported failure to void since came to the floor, NS initial bolus was then ordered, but patient got up from bed to go to bathroom prior to finishing the IVF initial bolus. Will continue volume repletion with monitoring vitals, orthostatic vitals after volume resuscitation, repeat H & H now as hemo vac was clamped during the day shift for excessive blood drainage, got 50 ml after being unclamped over one hour, now another 150 ml, will clamp again, to be followed up by ortho team. I & O close monitoring, BUN/Cr in am. Explained to patient.

## 2018-08-18 NOTE — PROGRESS NOTE ADULT - ASSESSMENT
87 yo male with OA, BPH, CAD, Paroxysmal Afib,  presents with long history, 7-8 year history of left knee pain.    1. S/p left TKR, stable  Cont pain control with IV Dilaudid and po Oxycodone prn ,   Cont PT and OT  DVT ppx with Eliquis- as per orthopedics recommendation.     2. Acute Post Op blood loss - Hb/HCt dropped   Patient is transfused 1 unit of PRBC.   Monitor Bp closely.     3. HTN - controlled   cont metoprolol with hold parameter and Losartan .  Monitor BP closely.     4. BPH cont cardura and monitor urine output, high risk for post op urinary retention.     5. CAD stable ,cont ASA and metoprolol and statin.     6. PAF , stable cont metoprolol and Eliquis.    7. HLD cont statin    8. Hx of TIA cont ASA, Statin.    Plan of care D/W Ortho PA , RN

## 2018-08-18 NOTE — OCCUPATIONAL THERAPY INITIAL EVALUATION ADULT - NS ASR FOLLOW COMMAND OT EVAL
Patient educated regarding fall prevention and home/hospital safety. Pt educated on use of AE/DME recommended for safety & the need to call for assistance. Patient with good understanding./100% of the time

## 2018-08-19 LAB
ANION GAP SERPL CALC-SCNC: 7 MMOL/L — SIGNIFICANT CHANGE UP (ref 5–17)
ANION GAP SERPL CALC-SCNC: 7 MMOL/L — SIGNIFICANT CHANGE UP (ref 5–17)
BUN SERPL-MCNC: 34 MG/DL — HIGH (ref 7–23)
BUN SERPL-MCNC: 36 MG/DL — HIGH (ref 7–23)
CALCIUM SERPL-MCNC: 8.4 MG/DL — SIGNIFICANT CHANGE UP (ref 8.4–10.5)
CALCIUM SERPL-MCNC: 8.6 MG/DL — SIGNIFICANT CHANGE UP (ref 8.4–10.5)
CHLORIDE SERPL-SCNC: 105 MMOL/L — SIGNIFICANT CHANGE UP (ref 96–108)
CHLORIDE SERPL-SCNC: 107 MMOL/L — SIGNIFICANT CHANGE UP (ref 96–108)
CO2 SERPL-SCNC: 26 MMOL/L — SIGNIFICANT CHANGE UP (ref 22–31)
CO2 SERPL-SCNC: 28 MMOL/L — SIGNIFICANT CHANGE UP (ref 22–31)
CREAT SERPL-MCNC: 1.84 MG/DL — HIGH (ref 0.5–1.3)
CREAT SERPL-MCNC: 1.85 MG/DL — HIGH (ref 0.5–1.3)
GLUCOSE SERPL-MCNC: 110 MG/DL — HIGH (ref 70–99)
GLUCOSE SERPL-MCNC: 113 MG/DL — HIGH (ref 70–99)
HCT VFR BLD CALC: 22.3 % — LOW (ref 39–50)
HCT VFR BLD CALC: 22.6 % — LOW (ref 39–50)
HCT VFR BLD CALC: 22.9 % — LOW (ref 39–50)
HGB BLD-MCNC: 7.5 G/DL — LOW (ref 13–17)
HGB BLD-MCNC: 7.6 G/DL — LOW (ref 13–17)
HGB BLD-MCNC: 7.7 G/DL — LOW (ref 13–17)
MCHC RBC-ENTMCNC: 31.8 PG — SIGNIFICANT CHANGE UP (ref 27–34)
MCHC RBC-ENTMCNC: 32.5 PG — SIGNIFICANT CHANGE UP (ref 27–34)
MCHC RBC-ENTMCNC: 32.5 PG — SIGNIFICANT CHANGE UP (ref 27–34)
MCHC RBC-ENTMCNC: 33.2 GM/DL — SIGNIFICANT CHANGE UP (ref 32–36)
MCHC RBC-ENTMCNC: 33.6 GM/DL — SIGNIFICANT CHANGE UP (ref 32–36)
MCHC RBC-ENTMCNC: 34.1 GM/DL — SIGNIFICANT CHANGE UP (ref 32–36)
MCV RBC AUTO: 95.4 FL — SIGNIFICANT CHANGE UP (ref 80–100)
MCV RBC AUTO: 95.8 FL — SIGNIFICANT CHANGE UP (ref 80–100)
MCV RBC AUTO: 96.5 FL — SIGNIFICANT CHANGE UP (ref 80–100)
NRBC # BLD: 0 /100 WBCS — SIGNIFICANT CHANGE UP (ref 0–0)
PLATELET # BLD AUTO: 103 K/UL — LOW (ref 150–400)
PLATELET # BLD AUTO: 104 K/UL — LOW (ref 150–400)
PLATELET # BLD AUTO: 104 K/UL — LOW (ref 150–400)
POTASSIUM SERPL-MCNC: 4.2 MMOL/L — SIGNIFICANT CHANGE UP (ref 3.5–5.3)
POTASSIUM SERPL-MCNC: 4.7 MMOL/L — SIGNIFICANT CHANGE UP (ref 3.5–5.3)
POTASSIUM SERPL-SCNC: 4.2 MMOL/L — SIGNIFICANT CHANGE UP (ref 3.5–5.3)
POTASSIUM SERPL-SCNC: 4.7 MMOL/L — SIGNIFICANT CHANGE UP (ref 3.5–5.3)
RBC # BLD: 2.31 M/UL — LOW (ref 4.2–5.8)
RBC # BLD: 2.37 M/UL — LOW (ref 4.2–5.8)
RBC # BLD: 2.39 M/UL — LOW (ref 4.2–5.8)
RBC # FLD: 14.2 % — SIGNIFICANT CHANGE UP (ref 10.3–14.5)
RBC # FLD: 14.2 % — SIGNIFICANT CHANGE UP (ref 10.3–14.5)
RBC # FLD: 14.3 % — SIGNIFICANT CHANGE UP (ref 10.3–14.5)
SODIUM SERPL-SCNC: 140 MMOL/L — SIGNIFICANT CHANGE UP (ref 135–145)
SODIUM SERPL-SCNC: 140 MMOL/L — SIGNIFICANT CHANGE UP (ref 135–145)
WBC # BLD: 10.29 K/UL — SIGNIFICANT CHANGE UP (ref 3.8–10.5)
WBC # BLD: 10.99 K/UL — HIGH (ref 3.8–10.5)
WBC # BLD: 9.46 K/UL — SIGNIFICANT CHANGE UP (ref 3.8–10.5)
WBC # FLD AUTO: 10.29 K/UL — SIGNIFICANT CHANGE UP (ref 3.8–10.5)
WBC # FLD AUTO: 10.99 K/UL — HIGH (ref 3.8–10.5)
WBC # FLD AUTO: 9.46 K/UL — SIGNIFICANT CHANGE UP (ref 3.8–10.5)

## 2018-08-19 PROCEDURE — 99233 SBSQ HOSP IP/OBS HIGH 50: CPT

## 2018-08-19 RX ADMIN — Medication 1 MILLIGRAM(S): at 22:00

## 2018-08-19 RX ADMIN — Medication 100 MILLIGRAM(S): at 22:00

## 2018-08-19 RX ADMIN — Medication 50 MILLIGRAM(S): at 05:48

## 2018-08-19 RX ADMIN — Medication 1000 MILLIGRAM(S): at 23:00

## 2018-08-19 RX ADMIN — Medication 1000 MILLIGRAM(S): at 05:48

## 2018-08-19 RX ADMIN — Medication 1000 MILLIGRAM(S): at 12:37

## 2018-08-19 RX ADMIN — APIXABAN 2.5 MILLIGRAM(S): 2.5 TABLET, FILM COATED ORAL at 09:45

## 2018-08-19 RX ADMIN — APIXABAN 2.5 MILLIGRAM(S): 2.5 TABLET, FILM COATED ORAL at 22:00

## 2018-08-19 RX ADMIN — LOSARTAN POTASSIUM 100 MILLIGRAM(S): 100 TABLET, FILM COATED ORAL at 05:46

## 2018-08-19 RX ADMIN — Medication 100 MILLIGRAM(S): at 12:37

## 2018-08-19 RX ADMIN — Medication 100 MILLIGRAM(S): at 05:48

## 2018-08-19 RX ADMIN — Medication 1000 MILLIGRAM(S): at 22:00

## 2018-08-19 RX ADMIN — PANTOPRAZOLE SODIUM 40 MILLIGRAM(S): 20 TABLET, DELAYED RELEASE ORAL at 12:37

## 2018-08-19 RX ADMIN — ATORVASTATIN CALCIUM 20 MILLIGRAM(S): 80 TABLET, FILM COATED ORAL at 22:00

## 2018-08-19 RX ADMIN — SENNA PLUS 2 TABLET(S): 8.6 TABLET ORAL at 22:00

## 2018-08-19 RX ADMIN — Medication 81 MILLIGRAM(S): at 22:04

## 2018-08-19 NOTE — PROGRESS NOTE ADULT - SUBJECTIVE AND OBJECTIVE BOX
Patient is a 86y old  Male who presents with a chief complaint of heart tremors (17 Aug 2018 19:50)      INTERVAL HPI/OVERNIGHT EVENTS:    MEDICATIONS  (STANDING):  acetaminophen   Tablet. 1000 milliGRAM(s) Oral every 8 hours  apixaban 2.5 milliGRAM(s) Oral every 12 hours  aspirin enteric coated 81 milliGRAM(s) Oral at bedtime  atorvastatin 20 milliGRAM(s) Oral at bedtime  docusate sodium 100 milliGRAM(s) Oral three times a day  doxazosin 1 milliGRAM(s) Oral at bedtime  lactated ringers. 1000 milliLiter(s) (75 mL/Hr) IV Continuous <Continuous>  losartan 100 milliGRAM(s) Oral daily  metoprolol succinate ER 50 milliGRAM(s) Oral daily  pantoprazole    Tablet 40 milliGRAM(s) Oral daily  senna 2 Tablet(s) Oral at bedtime    MEDICATIONS  (PRN):  aluminum hydroxide/magnesium hydroxide/simethicone Suspension 30 milliLiter(s) Oral four times a day PRN Indigestion  bisacodyl Suppository 10 milliGRAM(s) Rectal daily PRN If no bowel movement by postoperative day #2  HYDROmorphone  Injectable 0.5 milliGRAM(s) IV Push every 3 hours PRN Severe Pain (7 - 10)  magnesium hydroxide Suspension 30 milliLiter(s) Oral daily PRN Constipation  ondansetron Injectable 4 milliGRAM(s) IV Push every 6 hours PRN Nausea and/or Vomiting  oxyCODONE    IR 5 milliGRAM(s) Oral every 3 hours PRN Mild Pain (1 - 3)  oxyCODONE    IR 10 milliGRAM(s) Oral every 3 hours PRN Moderate Pain (4 - 6)  polyethylene glycol 3350 17 Gram(s) Oral daily PRN Constipation      Allergies    No Known Allergies    Intolerances        REVIEW OF SYSTEMS:  CONSTITUTIONAL: No fever or chills  HEENT:  No headache, no sore throat  RESPIRATORY: No cough, wheezing, or shortness of breath  CARDIOVASCULAR: No chest pain, palpitations, or leg swelling  GASTROINTESTINAL: No nausea, vomiting, or diarrhea  GENITOURINARY: No dysuria, frequency, or hematuria  NEUROLOGICAL: no focal weakness or dizziness  SKIN:  No rashes or lesions   MUSCULOSKELETAL: no myalgias   PSYCHIATRIC: No depression or anxiety      Vital Signs Last 24 Hrs  T(C): 36.6 (19 Aug 2018 07:52), Max: 37 (18 Aug 2018 19:00)  T(F): 97.8 (19 Aug 2018 07:52), Max: 98.6 (18 Aug 2018 19:00)  HR: 56 (19 Aug 2018 07:52) (54 - 64)  BP: 129/63 (19 Aug 2018 07:52) (116/57 - 148/68)  BP(mean): --  RR: 18 (19 Aug 2018 07:52) (16 - 18)  SpO2: 97% (19 Aug 2018 07:52) (94% - 100%)    PHYSICAL EXAM:  GENERAL: Elderly male lying in the bed not in acute distress.   HEENT:  EOMI, mmm  CHEST/LUNG:  CTA b/l, no rales, wheezes, or rhonchi  HEART:  RRR, S1, S2, []murmur  ABDOMEN:  BS+, soft, NT, ND  EXTREMITIES: no edema or calf tenderness , dressing dry and intact.   NERVOUS SYSTEM: AA&Ox3 , no focal neurological deficit.     DIET:     Cultures:     LABS:                        7.6    10.29 )-----------( 104      ( 19 Aug 2018 06:56 )             22.9     CBC Full  -  ( 19 Aug 2018 06:56 )  WBC Count : 10.29 K/uL  Hemoglobin : 7.6 g/dL  Hematocrit : 22.9 %  Platelet Count - Automated : 104 K/uL  Mean Cell Volume : 95.8 fl  Mean Cell Hemoglobin : 31.8 pg  Mean Cell Hemoglobin Concentration : 33.2 gm/dL  Auto Neutrophil # : x  Auto Lymphocyte # : x  Auto Monocyte # : x  Auto Eosinophil # : x  Auto Basophil # : x  Auto Neutrophil % : x  Auto Lymphocyte % : x  Auto Monocyte % : x  Auto Eosinophil % : x  Auto Basophil % : x    19 Aug 2018 06:56    140    |  107    |  36     ----------------------------<  110    4.7     |  26     |  1.85     Ca    8.6        19 Aug 2018 06:56          CAPILLARY BLOOD GLUCOSE              RADIOLOGY & ADDITIONAL TESTS:    Personally reviewed.     Consultant(s) Notes Reviewed:  [x] YES  [ ] NO    Care Discussed with [ ] Consultants  [x] Patient  [ ] Family  [x]      [ ] Other; RN  DVT ppx  Advanced directive

## 2018-08-19 NOTE — PROGRESS NOTE ADULT - SUBJECTIVE AND OBJECTIVE BOX
Post Op     JOSEPH LUEVANO      86y        Male                                                                                                                 T(C): 36.6 (08-19-18 @ 07:52), Max: 37 (08-18-18 @ 19:00)  HR: 56 (08-19-18 @ 07:52) (54 - 64)  BP: 129/63 (08-19-18 @ 07:52) (116/57 - 148/68)  RR: 18 (08-19-18 @ 07:52) (16 - 18)  SpO2: 97% (08-19-18 @ 07:52) (94% - 100%)      S/P   LT TKR    Patient denies shortness of breath, chest pain, dyspnea, No complaints  Pain is 3/10    Physical Exam    Extremity: Bilaterally:  No holmon                                           No Cord                                          PAS on b/l                                          Neurovascular intact                                          Motor intact EHL/FHL                                          Sensation intact                                          Pulses intact DP/PT                                         Calves Soft                                         Dressing Clean / Dry / Intact hv removed dressing changed , savannah  noted                                         Capillary refill with 5 seconds                           7.6    10.29 )-----------( 104      ( 19 Aug 2018 06:56 )             22.9       08-19    140  |  107  |  36<H>  ----------------------------<  110<H>  4.7   |  26  |  1.85<H>    Ca    8.6      19 Aug 2018 06:56        A/P  -- S/P total knee replacement    -  Medicine To Follow   - DVT prophylaxis PAS , Eliquis, [id number]  - PT & OT   - Analagesia  - Incentive Spirometry  - Discharge Planning  - Safety Precautions  -  CBC , BMP daily  - as per Ghassan Graves  recheck H/H at noon . Dr. Ferrell considering transfusion.

## 2018-08-19 NOTE — PROGRESS NOTE ADULT - ASSESSMENT
87 yo male with OA, BPH, CAD, Paroxysmal Afib,  presents with long history, 7-8 year history of left knee pain.    1. S/p left TKR, stable  Cont pain control with IV Dilaudid and po Oxycodone prn ,   Cont PT and OT  DVT ppx with Eliquis- as per orthopedics recommendation.     2. Acute Post Op blood loss - Hb/HCt dropped   Patient is transfused 1 unit of PRBC yesterday , not significant improvement in hb .  Repeat CBC ordered  for this afternoon.   Monitor CBC closely.     3. HTN - controlled   cont metoprolol with hold parameter and Losartan .  Monitor BP closely.   4. CHARLIE- Continue with IV hydration.   Monitor renal function closely.  Avoid Nephrotoxins.   Renal consult called..    5. BPH cont cardura and monitor urine output, high risk for post op urinary retention.     6. CAD stable ,cont ASA and metoprolol and statin.     7. PAF , stable cont metoprolol and Eliquis.    8. HLD cont statin    9. Hx of TIA cont ASA, Statin.    Plan of care D/W Ortho PA , RN

## 2018-08-20 ENCOUNTER — TRANSCRIPTION ENCOUNTER (OUTPATIENT)
Age: 83
End: 2018-08-20

## 2018-08-20 LAB
ANION GAP SERPL CALC-SCNC: 4 MMOL/L — LOW (ref 5–17)
BLD GP AB SCN SERPL QL: SIGNIFICANT CHANGE UP
BUN SERPL-MCNC: 30 MG/DL — HIGH (ref 7–23)
CALCIUM SERPL-MCNC: 8.6 MG/DL — SIGNIFICANT CHANGE UP (ref 8.4–10.5)
CHLORIDE SERPL-SCNC: 107 MMOL/L — SIGNIFICANT CHANGE UP (ref 96–108)
CO2 SERPL-SCNC: 28 MMOL/L — SIGNIFICANT CHANGE UP (ref 22–31)
CREAT SERPL-MCNC: 1.6 MG/DL — HIGH (ref 0.5–1.3)
GLUCOSE SERPL-MCNC: 100 MG/DL — HIGH (ref 70–99)
HCT VFR BLD CALC: 21.4 % — LOW (ref 39–50)
HGB BLD-MCNC: 7.2 G/DL — LOW (ref 13–17)
MCHC RBC-ENTMCNC: 32.1 PG — SIGNIFICANT CHANGE UP (ref 27–34)
MCHC RBC-ENTMCNC: 33.6 GM/DL — SIGNIFICANT CHANGE UP (ref 32–36)
MCV RBC AUTO: 95.5 FL — SIGNIFICANT CHANGE UP (ref 80–100)
NRBC # BLD: 0 /100 WBCS — SIGNIFICANT CHANGE UP (ref 0–0)
PLATELET # BLD AUTO: 101 K/UL — LOW (ref 150–400)
POTASSIUM SERPL-MCNC: 4.2 MMOL/L — SIGNIFICANT CHANGE UP (ref 3.5–5.3)
POTASSIUM SERPL-SCNC: 4.2 MMOL/L — SIGNIFICANT CHANGE UP (ref 3.5–5.3)
RBC # BLD: 2.24 M/UL — LOW (ref 4.2–5.8)
RBC # FLD: 14.1 % — SIGNIFICANT CHANGE UP (ref 10.3–14.5)
SODIUM SERPL-SCNC: 139 MMOL/L — SIGNIFICANT CHANGE UP (ref 135–145)
WBC # BLD: 8.58 K/UL — SIGNIFICANT CHANGE UP (ref 3.8–10.5)
WBC # FLD AUTO: 8.58 K/UL — SIGNIFICANT CHANGE UP (ref 3.8–10.5)

## 2018-08-20 PROCEDURE — 86901 BLOOD TYPING SEROLOGIC RH(D): CPT

## 2018-08-20 PROCEDURE — 85027 COMPLETE CBC AUTOMATED: CPT

## 2018-08-20 PROCEDURE — 87641 MR-STAPH DNA AMP PROBE: CPT

## 2018-08-20 PROCEDURE — 86850 RBC ANTIBODY SCREEN: CPT

## 2018-08-20 PROCEDURE — 99233 SBSQ HOSP IP/OBS HIGH 50: CPT

## 2018-08-20 PROCEDURE — G0463: CPT

## 2018-08-20 PROCEDURE — 86900 BLOOD TYPING SEROLOGIC ABO: CPT

## 2018-08-20 PROCEDURE — 93005 ELECTROCARDIOGRAM TRACING: CPT

## 2018-08-20 PROCEDURE — 86923 COMPATIBILITY TEST ELECTRIC: CPT

## 2018-08-20 PROCEDURE — 80053 COMPREHEN METABOLIC PANEL: CPT

## 2018-08-20 PROCEDURE — 85730 THROMBOPLASTIN TIME PARTIAL: CPT

## 2018-08-20 PROCEDURE — 85610 PROTHROMBIN TIME: CPT

## 2018-08-20 PROCEDURE — 87640 STAPH A DNA AMP PROBE: CPT

## 2018-08-20 RX ORDER — ACETAMINOPHEN 500 MG
2 TABLET ORAL
Qty: 0 | Refills: 0 | COMMUNITY
Start: 2018-08-20 | End: 2018-08-31

## 2018-08-20 RX ADMIN — PANTOPRAZOLE SODIUM 40 MILLIGRAM(S): 20 TABLET, DELAYED RELEASE ORAL at 11:15

## 2018-08-20 RX ADMIN — Medication 100 MILLIGRAM(S): at 21:05

## 2018-08-20 RX ADMIN — Medication 1000 MILLIGRAM(S): at 06:00

## 2018-08-20 RX ADMIN — Medication 100 MILLIGRAM(S): at 06:00

## 2018-08-20 RX ADMIN — Medication 1000 MILLIGRAM(S): at 15:08

## 2018-08-20 RX ADMIN — Medication 1 MILLIGRAM(S): at 21:05

## 2018-08-20 RX ADMIN — APIXABAN 2.5 MILLIGRAM(S): 2.5 TABLET, FILM COATED ORAL at 21:05

## 2018-08-20 RX ADMIN — ATORVASTATIN CALCIUM 20 MILLIGRAM(S): 80 TABLET, FILM COATED ORAL at 21:05

## 2018-08-20 RX ADMIN — Medication 50 MILLIGRAM(S): at 06:00

## 2018-08-20 RX ADMIN — Medication 100 MILLIGRAM(S): at 15:06

## 2018-08-20 RX ADMIN — Medication 1000 MILLIGRAM(S): at 22:38

## 2018-08-20 RX ADMIN — SENNA PLUS 2 TABLET(S): 8.6 TABLET ORAL at 21:05

## 2018-08-20 RX ADMIN — APIXABAN 2.5 MILLIGRAM(S): 2.5 TABLET, FILM COATED ORAL at 09:28

## 2018-08-20 RX ADMIN — Medication 81 MILLIGRAM(S): at 21:05

## 2018-08-20 RX ADMIN — Medication 1000 MILLIGRAM(S): at 06:26

## 2018-08-20 NOTE — DISCHARGE NOTE ADULT - HOSPITAL COURSE
This patient was admitted to [facility] with a history of severe degenerative joint disease of the left knee.  Patient went to Pre-Surgical Testing at [facility] and was medically cleared to undergo elective procedure. Patient underwent left TKR by Dr. Que Burgos on 8/17/18. Procedure was well tolerated. Patient received antibiotic according to SCIP guidelines for infection prevention. Patient received 1 unit PRBCs on 8/18/18 for post-operative anemia and 2 more units on 8/20/18.  Eliquis was given for DVT prophylaxis.  Anesthesia, Medical Hospitalist, Physical Therapy and Occupational Therapy were consulted. Patient is stable for discharge with a good prognosis.  Appropriate discharge instructions and medications are provided in this document. This patient was admitted to [facility] with a history of severe degenerative joint disease of the left knee.  Patient went to Pre-Surgical Testing at [facility] and was medically cleared to undergo elective procedure. Patient underwent left TKR by Dr. Que Burgos on 8/17/18. Procedure was well tolerated. Patient received antibiotic according to SCIP guidelines for infection prevention. Patient received 1 unit PRBCs on 8/18/18 for post-operative anemia and 2 more units on 8/20/18.  Eliquis was given for DVT prophylaxis.  Patient developed urinary retention and menezes catheter was placed.  Patient subsequently developed gross hematuria, urology consult was obtained and Menezes catheter reinserted, Anesthesia, Medical Hospitalist, Cardiology, Physical Therapy and Occupational Therapy were also consulted during patient's stay. Patient is stable for discharge with a good prognosis.  Appropriate discharge instructions and medications are provided in this document. This patient was admitted to [facility] with a history of severe degenerative joint disease of the left knee.  Patient went to Pre-Surgical Testing at [facility] and was medically cleared to undergo elective procedure. Patient underwent left TKR by Dr. Que Burgos on 8/17/18. Procedure was well tolerated. Patient received antibiotic according to SCIP guidelines for infection prevention. Patient received 1 unit PRBCs on 8/18/18 for post-operative anemia and 2 more units on 8/20/18.  Eliquis was given for DVT prophylaxis.  Patient developed urinary retention and menezes catheter was placed.  Patient subsequently developed gross hematuria, urology consult was obtained and Menezes catheter reinserted, Anesthesia, Medical Hospitalist, Cardiology, Physical Therapy and Occupational Therapy were also consulted during patient's stay. Patient is stable for discharge with a good prognosis.  Discharge medications reviewed with Hospitalist.   Appropriate discharge instructions and medications are provided in this document.

## 2018-08-20 NOTE — DISCHARGE NOTE ADULT - MEDICATION SUMMARY - MEDICATIONS TO STOP TAKING
I will STOP taking the medications listed below when I get home from the hospital:    Benicar HCT 40 mg-25 mg oral tablet  -- 1 tab(s) by mouth once a day (in the evening)    Pepcid AC Maximum Strength 20 mg oral tablet  -- 1 tab(s) by mouth 2 times a day(x2 preoperatively)

## 2018-08-20 NOTE — DISCHARGE NOTE ADULT - INSTRUCTIONS
none  For Constipation :   • Increase your water intake. Drink at least 8 glasses of water daily.  • Try adding fiber to your diet by eating fruits, vegetables and foods that are rich in grains.  • If you do experience constipation, you may take an over-the-counter stool softener/laxative such as Michaela Colace, [place] or  Milk of Magnesia.

## 2018-08-20 NOTE — DISCHARGE NOTE ADULT - NS AS ACTIVITY OBS
Walking-Outdoors allowed/Do not drive or operate machinery/No Heavy lifting/straining/Walking-Indoors allowed/Stairs allowed

## 2018-08-20 NOTE — DISCHARGE NOTE ADULT - MEDICATION SUMMARY - MEDICATIONS TO TAKE
I will START or STAY ON the medications listed below when I get home from the hospital:    finasteride 5 mg oral tablet  -- 1 tab(s) by mouth once a day  -- Indication: For urinary retention    oxyCODONE 10 mg oral tablet  -- 1 tab(s) by mouth every 3 hours, As needed, Moderate Pain (4 - 6)  -- Indication: For Pain    oxyCODONE 5 mg oral tablet  -- 1 tab(s) by mouth every 3 hours, As needed, Mild Pain (1 - 3)  -- Indication: For Pain    acetaminophen 500 mg oral tablet  -- 2 tab(s) by mouth every 12 hours  -- Indication: For Pain    aspirin 81 mg oral tablet  -- 1 tab(s) by mouth once a day (at bedtime)  -- Indication: For Prevention of heart attack, stroke, blood clots    doxazosin 1 mg oral tablet  -- 1 tab(s) by mouth once a day  -- Indication: For urinary retention    apixaban 2.5 mg oral tablet  -- 1 tab(s) by mouth 2 times a day  -- Indication: For Atrial fibrillation, prevention of blood clots    Lipitor 20 mg oral tablet  -- 1 tab(s) by mouth once a day (in the evening)  -- Indication: For high cholesterol    metoprolol succinate 25 mg oral tablet, extended release  -- 1 tab(s) by mouth once a day  -- Indication: For high blood pressure    ferrous sulfate 325 mg (65 mg elemental iron) oral tablet  -- 1 tab(s) by mouth once a day  -- Indication: For iron supplement    senna oral tablet  -- 2 tab(s) by mouth once a day (at bedtime)  -- Indication: For Constipation    docusate sodium 100 mg oral capsule  -- 1 cap(s) by mouth 3 times a day  -- Indication: For stool softener    polyethylene glycol 3350 oral powder for reconstitution  -- 17 gram(s) by mouth once a day, As needed, Constipation  -- Indication: For Constipation    pantoprazole 40 mg oral delayed release tablet  -- 1 tab(s) by mouth once a day  -- Indication: For Prevention of ulcers

## 2018-08-20 NOTE — DISCHARGE NOTE ADULT - CARE PROVIDER_API CALL
Que Burgos), Orthopaedic Surgery  [address] [place] [address]  Phone: [phone]  Fax: [phone]

## 2018-08-20 NOTE — PROGRESS NOTE ADULT - SUBJECTIVE AND OBJECTIVE BOX
JOSEPH LUEVANO                                                                [id number]                                                     Allergies---No Known Allergies        Pt is a 86y year old Male s/p left TKR.   Pt. is A&O x 3, resting comfortably.   He is stating that he feels tired/lack of energy with a tingling sensation in both feet/lower extremities.   He stats that with this lack of energy, he has been unable to walk to the bathroom.   He also stats that although he did have BM he feels he has no appetite.   Pain is 2/10.   Tolerating the diet.   Denies chest pain / shortness of breath / dyspnea / nausea / vomiting / headaches or light headed ness.           Vital Signs Last 24 Hrs  T(F): 98.4 (08-20-18 @ 07:17), Max: 98.4 (08-20-18 @ 07:17)  HR: 54 (08-20-18 @ 07:17)  BP: 134/55 (08-20-18 @ 07:17)  RR: 15 (08-20-18 @ 07:17)  SpO2: 95% (08-20-18 @ 07:17)    I&O's Detail    19 Aug 2018 07:01  -  20 Aug 2018 07:00  --------------------------------------------------------  IN:    Oral Fluid: 1130 mL  Total IN: 1130 mL    OUT:    Indwelling Catheter - Urethral: 1200 mL    Voided: 1300 mL  Total OUT: 2500 mL    Total NET: -1370 mL        PE:   Left Lower Extremity:   Dressing is C/D/I.   Dressing changed.   Incision is clean and dry.   The wound is closed with prineo.   No redness, swelling, heat, discharge or other evidence of infection, superficial or deep.   Neurovascularly intact.   No gross evidence of motor or sensory deficit.   +2 DP/PT pulses.   EHL/FHL/TA intact.   Toes are pink and mobile.   Capillary refill < 2 seconds.   Negative calf tenderness.   PAS on.                                7.2    8.58  )--------------( 101                          08-20-18 @ 07:57               21.4         139   |  107  |  30  -----------------------------<  100                  08-20-18 @ 07:57  4.2    |  28    |  1.60        Ca    8.6              A:   Pt is a 86y year old Male S/P left total knee replacement, Post Op Day #3        Plan:    - Follow up with Medicine    - OOB with PT/OT   - Pain control    - D/C clif (trial void- pt has hx of BPH)   - Incentive spirometry   - transfuse 2 units PRBCs   - Labs in A.M.   - Discharge Planning   - DVT ppx = PAS +  apixaban 2.5 milliGRAM(s) Oral every 12 hours  aspirin enteric coated 81 milliGRAM(s) Oral at bedtime                                                                                                                                                                             Mars Gaspar RPA-C

## 2018-08-20 NOTE — PROVIDER CONTACT NOTE (OTHER) - SITUATION
S/P PATIENT HAD LEFT KNEE DONE ON 8/17, S/P PATIENT HAD LEFT KNEE DONE ON 8/17,patient has menezes cath,noted this am, small blood tinged urine in the menezes tube,and urine is red,no bleeding noted around penis,menezes cath is intact in place

## 2018-08-20 NOTE — DISCHARGE NOTE ADULT - CARE PLAN
Principal Discharge DX:	Primary osteoarthritis of left knee  Goal:	Improve ambulation, ADLs and quality of life  Assessment and plan of treatment:	Physical Therapy/Occupational Therapy for ambulation, transfers, stairs, ADL's, Range of Motion Exercises, Isometrics.  Full weight bearing as tolerated with rolling walker  Range of Motion Goals: Flexion 120 degrees; Extension 0 degrees  Keep incision clean and dry.  Prineo dressing removal 14 days after surgery at rehab facility or Surgeon's office  May shower post-op day #5 if no drainage from incision  - Call your doctor if you experience:  • An increase in pain not controlled by pain medication or change in activity or position.  • Temperature greater than 101° F.  • Redness, increased swelling or foul smelling drainage from or around the incision.  • Numbness, tingling or a change in color or temperature of the operative leg.  • Call your doctor immediately if you experience chest pain, shortness of breath or calf pain. Principal Discharge DX:	Primary osteoarthritis of left knee  Goal:	Improve ambulation, ADLs and quality of life  Assessment and plan of treatment:	Physical Therapy/Occupational Therapy for ambulation, transfers, stairs, ADL's, Range of Motion Exercises, Isometrics.  Full weight bearing as tolerated with rolling walker  Range of Motion Goals: Flexion 120 degrees; Extension 0 degrees  Keep incision clean and dry.  Prineo dressing removal 14 days after surgery at rehab facility or Surgeon's office  May shower post-op day #5 if no drainage from incision  - Call your doctor if you experience:  • An increase in pain not controlled by pain medication or change in activity or position.  • Temperature greater than 101° F.  • Redness, increased swelling or foul smelling drainage from or around the incision.  • Numbness, tingling or a change in color or temperature of the operative leg.  • Call your doctor immediately if you experience chest pain, shortness of breath or calf pain.  Assessment and plan of treatment:	Continue menezes catheter until ambulating well, then proceed to trial of void.  Follow CBC (labs 8/23) Principal Discharge DX:	Primary osteoarthritis of left knee  Goal:	Improve ambulation, ADLs and quality of life  Assessment and plan of treatment:	Physical Therapy/Occupational Therapy for ambulation, transfers, stairs, ADL's, Range of Motion Exercises, Isometrics.  Full weight bearing as tolerated with rolling walker  Range of Motion Goals: Flexion 120 degrees; Extension 0 degrees  Keep incision clean and dry.  Prineo dressing removal 14 days after surgery at rehab facility or Surgeon's office  May shower post-op day #5 if no drainage from incision  - Call your doctor if you experience:  • An increase in pain not controlled by pain medication or change in activity or position.  • Temperature greater than 101° F.  • Redness, increased swelling or foul smelling drainage from or around the incision.  • Numbness, tingling or a change in color or temperature of the operative leg.  • Call your doctor immediately if you experience chest pain, shortness of breath or calf pain.  Assessment and plan of treatment:	Continue menezes catheter until ambulating well, then proceed to trial of void.  Follow CBC (labs 8/23)  Optimize blood pressure in rehab

## 2018-08-20 NOTE — PROGRESS NOTE ADULT - SUBJECTIVE AND OBJECTIVE BOX
Patient is a 86y old  Male who presents with a chief complaint of heart tremors (17 Aug 2018 19:50)      INTERVAL HPI/OVERNIGHT EVENTS:  Pt is seen and examined.  c/o feeling weak, lethargic.  low H/H.    Pain Location & Control:     MEDICATIONS  (STANDING):  acetaminophen   Tablet. 1000 milliGRAM(s) Oral every 8 hours  apixaban 2.5 milliGRAM(s) Oral every 12 hours  aspirin enteric coated 81 milliGRAM(s) Oral at bedtime  atorvastatin 20 milliGRAM(s) Oral at bedtime  docusate sodium 100 milliGRAM(s) Oral three times a day  doxazosin 1 milliGRAM(s) Oral at bedtime  lactated ringers. 1000 milliLiter(s) (75 mL/Hr) IV Continuous <Continuous>  metoprolol succinate ER 50 milliGRAM(s) Oral daily  pantoprazole    Tablet 40 milliGRAM(s) Oral daily  senna 2 Tablet(s) Oral at bedtime    MEDICATIONS  (PRN):  aluminum hydroxide/magnesium hydroxide/simethicone Suspension 30 milliLiter(s) Oral four times a day PRN Indigestion  bisacodyl Suppository 10 milliGRAM(s) Rectal daily PRN If no bowel movement by postoperative day #2  HYDROmorphone  Injectable 0.5 milliGRAM(s) IV Push every 3 hours PRN Severe Pain (7 - 10)  magnesium hydroxide Suspension 30 milliLiter(s) Oral daily PRN Constipation  ondansetron Injectable 4 milliGRAM(s) IV Push every 6 hours PRN Nausea and/or Vomiting  oxyCODONE    IR 5 milliGRAM(s) Oral every 3 hours PRN Mild Pain (1 - 3)  oxyCODONE    IR 10 milliGRAM(s) Oral every 3 hours PRN Moderate Pain (4 - 6)  polyethylene glycol 3350 17 Gram(s) Oral daily PRN Constipation      Allergies    No Known Allergies    Intolerances            Vital Signs Last 24 Hrs  T(C): 36.9 (20 Aug 2018 07:17), Max: 36.9 (19 Aug 2018 15:36)  T(F): 98.4 (20 Aug 2018 07:17), Max: 98.4 (19 Aug 2018 15:36)  HR: 54 (20 Aug 2018 07:17) (53 - 70)  BP: 134/55 (20 Aug 2018 07:17) (115/66 - 144/64)  BP(mean): --  RR: 15 (20 Aug 2018 07:17) (15 - 20)  SpO2: 95% (20 Aug 2018 07:17) (94% - 98%)        LABS:                        7.2    8.58  )-----------( 101      ( 20 Aug 2018 07:57 )             21.4     20 Aug 2018 07:57    139    |  107    |  30     ----------------------------<  100    4.2     |  28     |  1.60     Ca    8.6        20 Aug 2018 07:57

## 2018-08-20 NOTE — DISCHARGE NOTE ADULT - PATIENT PORTAL LINK FT
You can access the [email or web address], offered by [facility], by registering with the following website: [email or web address]

## 2018-08-20 NOTE — PROVIDER CONTACT NOTE (OTHER) - BACKGROUND
pt has histiory for low hgb level, transfused one unit prbc yesterday, recived pt at 19:00 with yellow urine in the menezes ,

## 2018-08-20 NOTE — PROGRESS NOTE ADULT - ASSESSMENT
85 yo male with OA, BPH, CAD, Paroxysmal Afib,  presents with long history, 7-8 year history of left knee pain.    1. S/p left TKR, stable  Cont pain control with IV Dilaudid and po Oxycodone prn ,   Cont PT and OT  DVT ppx with Eliquis- as per orthopedics recommendation.     2. Acute Post Op blood loss - Hb/HCt dropped   Patient is transfused 1 unit of PRBC on , not significant improvement in hb .  Patient is symptomatic, h/o CAD  will transfuse 2 units of PRBC today.    3. HTN - controlled   cont metoprolol with hold parameter and Losartan .  Monitor BP closely.   4. CHARLIE-   Monitor renal function closely.  Avoid Nephrotoxins.   Renal consult called..    5. BPH cont Cardura and monitor urine output, high risk for post op urinary retention.     6. CAD stable ,cont ASA and metoprolol and statin.     7. PAF , stable cont metoprolol and Eliquis.    8. HLD cont statin    9. Hx of TIA cont ASA, Statin.

## 2018-08-20 NOTE — PROVIDER CONTACT NOTE (OTHER) - ASSESSMENT
patient viced no discomfort,no distress at night,pt is hemodynamically stable, no other visible bleeding noted, urine is color is red in the menezes bag, no c/o pain noted

## 2018-08-20 NOTE — PROGRESS NOTE ADULT - SUBJECTIVE AND OBJECTIVE BOX
Subjective: c/o depressed appetite. No dyspnea      MEDICATIONS  (STANDING):  acetaminophen   Tablet. 1000 milliGRAM(s) Oral every 8 hours  apixaban 2.5 milliGRAM(s) Oral every 12 hours  aspirin enteric coated 81 milliGRAM(s) Oral at bedtime  atorvastatin 20 milliGRAM(s) Oral at bedtime  docusate sodium 100 milliGRAM(s) Oral three times a day  doxazosin 1 milliGRAM(s) Oral at bedtime  lactated ringers. 1000 milliLiter(s) (75 mL/Hr) IV Continuous <Continuous>  metoprolol succinate ER 50 milliGRAM(s) Oral daily  pantoprazole    Tablet 40 milliGRAM(s) Oral daily  senna 2 Tablet(s) Oral at bedtime    MEDICATIONS  (PRN):  aluminum hydroxide/magnesium hydroxide/simethicone Suspension 30 milliLiter(s) Oral four times a day PRN Indigestion  bisacodyl Suppository 10 milliGRAM(s) Rectal daily PRN If no bowel movement by postoperative day #2  HYDROmorphone  Injectable 0.5 milliGRAM(s) IV Push every 3 hours PRN Severe Pain (7 - 10)  magnesium hydroxide Suspension 30 milliLiter(s) Oral daily PRN Constipation  ondansetron Injectable 4 milliGRAM(s) IV Push every 6 hours PRN Nausea and/or Vomiting  oxyCODONE    IR 5 milliGRAM(s) Oral every 3 hours PRN Mild Pain (1 - 3)  oxyCODONE    IR 10 milliGRAM(s) Oral every 3 hours PRN Moderate Pain (4 - 6)  polyethylene glycol 3350 17 Gram(s) Oral daily PRN Constipation          T(C): 36.9 (08-20-18 @ 07:17), Max: 36.9 (08-19-18 @ 15:36)  HR: 54 (08-20-18 @ 07:17) (53 - 70)  BP: 134/55 (08-20-18 @ 07:17) (115/66 - 144/64)  RR: 15 (08-20-18 @ 07:17) (15 - 20)  SpO2: 95% (08-20-18 @ 07:17) (94% - 98%)  Wt(kg): --        I&O's Detail    19 Aug 2018 07:01  -  20 Aug 2018 07:00  --------------------------------------------------------  IN:    Oral Fluid: 1130 mL  Total IN: 1130 mL    OUT:    Indwelling Catheter - Urethral: 1200 mL    Voided: 1300 mL  Total OUT: 2500 mL    Total NET: -1370 mL               PHYSICAL EXAM:    GENERAL: comfortable  EYES: EOMI, PERRLA, conjunctiva and sclera clear  NECK: Supple, no inc in JVP  CHEST/LUNG: Clear  HEART: S1S2  ABDOMEN: Soft, Nontender, Nondistended; Bowel sounds present  EXTREMITIES:  post L TKR. Trace edema on L  NEURO: no asterixis      LABS:  CBC Full  -  ( 20 Aug 2018 07:57 )  WBC Count : 8.58 K/uL  Hemoglobin : 7.2 g/dL  Hematocrit : 21.4 %  Platelet Count - Automated : 101 K/uL  Mean Cell Volume : 95.5 fl  Mean Cell Hemoglobin : 32.1 pg  Mean Cell Hemoglobin Concentration : 33.6 gm/dL  Auto Neutrophil # : x  Auto Lymphocyte # : x  Auto Monocyte # : x  Auto Eosinophil # : x  Auto Basophil # : x  Auto Neutrophil % : x  Auto Lymphocyte % : x  Auto Monocyte % : x  Auto Eosinophil % : x  Auto Basophil % : x    08-20    139  |  107  |  30<H>  ----------------------------<  100<H>  4.2   |  28  |  1.60<H>    Ca    8.6      20 Aug 2018 07:57            Impression:  * CHARLIE -- pre-renal  * CKD -- Cr 1.4 in past  * Acute post op anemia  Recommendations:   * Cont to watch off ARB  * D/c IVFs  * Transfuse a unit of blood  * D/c Caceres if ambulates

## 2018-08-20 NOTE — DISCHARGE NOTE ADULT - PLAN OF CARE
Improve ambulation, ADLs and quality of life Physical Therapy/Occupational Therapy for ambulation, transfers, stairs, ADL's, Range of Motion Exercises, Isometrics.  Full weight bearing as tolerated with rolling walker  Range of Motion Goals: Flexion 120 degrees; Extension 0 degrees  Keep incision clean and dry.  Prineo dressing removal 14 days after surgery at rehab facility or Surgeon's office  May shower post-op day #5 if no drainage from incision  - Call your doctor if you experience:  • An increase in pain not controlled by pain medication or change in activity or position.  • Temperature greater than 101° F.  • Redness, increased swelling or foul smelling drainage from or around the incision.  • Numbness, tingling or a change in color or temperature of the operative leg.  • Call your doctor immediately if you experience chest pain, shortness of breath or calf pain. Continue menezes catheter until ambulating well, then proceed to trial of void.  Follow CBC (labs 8/23) Continue menezes catheter until ambulating well, then proceed to trial of void.  Follow CBC (labs 8/23)  Optimize blood pressure in rehab

## 2018-08-21 DIAGNOSIS — I10 ESSENTIAL (PRIMARY) HYPERTENSION: ICD-10-CM

## 2018-08-21 DIAGNOSIS — D50.0 IRON DEFICIENCY ANEMIA SECONDARY TO BLOOD LOSS (CHRONIC): ICD-10-CM

## 2018-08-21 DIAGNOSIS — I48.0 PAROXYSMAL ATRIAL FIBRILLATION: ICD-10-CM

## 2018-08-21 DIAGNOSIS — I25.10 ATHEROSCLEROTIC HEART DISEASE OF NATIVE CORONARY ARTERY WITHOUT ANGINA PECTORIS: ICD-10-CM

## 2018-08-21 LAB
ANION GAP SERPL CALC-SCNC: 8 MMOL/L — SIGNIFICANT CHANGE UP (ref 5–17)
BUN SERPL-MCNC: 24 MG/DL — HIGH (ref 7–23)
CALCIUM SERPL-MCNC: 8.5 MG/DL — SIGNIFICANT CHANGE UP (ref 8.4–10.5)
CHLORIDE SERPL-SCNC: 104 MMOL/L — SIGNIFICANT CHANGE UP (ref 96–108)
CO2 SERPL-SCNC: 28 MMOL/L — SIGNIFICANT CHANGE UP (ref 22–31)
CREAT SERPL-MCNC: 1.34 MG/DL — HIGH (ref 0.5–1.3)
GLUCOSE SERPL-MCNC: 116 MG/DL — HIGH (ref 70–99)
HCT VFR BLD CALC: 25.8 % — LOW (ref 39–50)
HCT VFR BLD CALC: 28 % — LOW (ref 39–50)
HGB BLD-MCNC: 8.8 G/DL — LOW (ref 13–17)
HGB BLD-MCNC: 9.7 G/DL — LOW (ref 13–17)
MCHC RBC-ENTMCNC: 31.4 PG — SIGNIFICANT CHANGE UP (ref 27–34)
MCHC RBC-ENTMCNC: 32.1 PG — SIGNIFICANT CHANGE UP (ref 27–34)
MCHC RBC-ENTMCNC: 34.1 GM/DL — SIGNIFICANT CHANGE UP (ref 32–36)
MCHC RBC-ENTMCNC: 34.6 GM/DL — SIGNIFICANT CHANGE UP (ref 32–36)
MCV RBC AUTO: 92.1 FL — SIGNIFICANT CHANGE UP (ref 80–100)
MCV RBC AUTO: 92.7 FL — SIGNIFICANT CHANGE UP (ref 80–100)
NRBC # BLD: 0 /100 WBCS — SIGNIFICANT CHANGE UP (ref 0–0)
NRBC # BLD: 0 /100 WBCS — SIGNIFICANT CHANGE UP (ref 0–0)
PLATELET # BLD AUTO: 111 K/UL — LOW (ref 150–400)
PLATELET # BLD AUTO: 116 K/UL — LOW (ref 150–400)
POTASSIUM SERPL-MCNC: 4.1 MMOL/L — SIGNIFICANT CHANGE UP (ref 3.5–5.3)
POTASSIUM SERPL-SCNC: 4.1 MMOL/L — SIGNIFICANT CHANGE UP (ref 3.5–5.3)
RBC # BLD: 2.8 M/UL — LOW (ref 4.2–5.8)
RBC # BLD: 3.02 M/UL — LOW (ref 4.2–5.8)
RBC # FLD: 14.4 % — SIGNIFICANT CHANGE UP (ref 10.3–14.5)
RBC # FLD: 14.5 % — SIGNIFICANT CHANGE UP (ref 10.3–14.5)
SODIUM SERPL-SCNC: 140 MMOL/L — SIGNIFICANT CHANGE UP (ref 135–145)
WBC # BLD: 8.43 K/UL — SIGNIFICANT CHANGE UP (ref 3.8–10.5)
WBC # BLD: 9.04 K/UL — SIGNIFICANT CHANGE UP (ref 3.8–10.5)
WBC # FLD AUTO: 8.43 K/UL — SIGNIFICANT CHANGE UP (ref 3.8–10.5)
WBC # FLD AUTO: 9.04 K/UL — SIGNIFICANT CHANGE UP (ref 3.8–10.5)

## 2018-08-21 PROCEDURE — 99223 1ST HOSP IP/OBS HIGH 75: CPT

## 2018-08-21 PROCEDURE — 99233 SBSQ HOSP IP/OBS HIGH 50: CPT

## 2018-08-21 RX ORDER — FERROUS SULFATE 325(65) MG
325 TABLET ORAL DAILY
Qty: 0 | Refills: 0 | Status: DISCONTINUED | OUTPATIENT
Start: 2018-08-21 | End: 2018-08-22

## 2018-08-21 RX ORDER — FINASTERIDE 5 MG/1
5 TABLET, FILM COATED ORAL DAILY
Qty: 0 | Refills: 0 | Status: DISCONTINUED | OUTPATIENT
Start: 2018-08-21 | End: 2018-08-22

## 2018-08-21 RX ORDER — TAMSULOSIN HYDROCHLORIDE 0.4 MG/1
0.8 CAPSULE ORAL AT BEDTIME
Qty: 0 | Refills: 0 | Status: DISCONTINUED | OUTPATIENT
Start: 2018-08-21 | End: 2018-08-22

## 2018-08-21 RX ORDER — TAMSULOSIN HYDROCHLORIDE 0.4 MG/1
0.4 CAPSULE ORAL AT BEDTIME
Qty: 0 | Refills: 0 | Status: DISCONTINUED | OUTPATIENT
Start: 2018-08-21 | End: 2018-08-21

## 2018-08-21 RX ADMIN — TAMSULOSIN HYDROCHLORIDE 0.8 MILLIGRAM(S): 0.4 CAPSULE ORAL at 21:14

## 2018-08-21 RX ADMIN — Medication 1000 MILLIGRAM(S): at 15:28

## 2018-08-21 RX ADMIN — OXYCODONE HYDROCHLORIDE 5 MILLIGRAM(S): 5 TABLET ORAL at 17:36

## 2018-08-21 RX ADMIN — PANTOPRAZOLE SODIUM 40 MILLIGRAM(S): 20 TABLET, DELAYED RELEASE ORAL at 13:16

## 2018-08-21 RX ADMIN — Medication 100 MILLIGRAM(S): at 13:18

## 2018-08-21 RX ADMIN — Medication 1 MILLIGRAM(S): at 21:15

## 2018-08-21 RX ADMIN — APIXABAN 2.5 MILLIGRAM(S): 2.5 TABLET, FILM COATED ORAL at 21:15

## 2018-08-21 RX ADMIN — Medication 100 MILLIGRAM(S): at 05:22

## 2018-08-21 RX ADMIN — Medication 1000 MILLIGRAM(S): at 05:23

## 2018-08-21 RX ADMIN — OXYCODONE HYDROCHLORIDE 5 MILLIGRAM(S): 5 TABLET ORAL at 08:45

## 2018-08-21 RX ADMIN — OXYCODONE HYDROCHLORIDE 5 MILLIGRAM(S): 5 TABLET ORAL at 18:06

## 2018-08-21 RX ADMIN — OXYCODONE HYDROCHLORIDE 5 MILLIGRAM(S): 5 TABLET ORAL at 09:15

## 2018-08-21 RX ADMIN — APIXABAN 2.5 MILLIGRAM(S): 2.5 TABLET, FILM COATED ORAL at 08:45

## 2018-08-21 RX ADMIN — SENNA PLUS 2 TABLET(S): 8.6 TABLET ORAL at 21:14

## 2018-08-21 RX ADMIN — ATORVASTATIN CALCIUM 20 MILLIGRAM(S): 80 TABLET, FILM COATED ORAL at 21:15

## 2018-08-21 RX ADMIN — Medication 1000 MILLIGRAM(S): at 23:30

## 2018-08-21 RX ADMIN — Medication 81 MILLIGRAM(S): at 21:14

## 2018-08-21 RX ADMIN — Medication 100 MILLIGRAM(S): at 21:15

## 2018-08-21 RX ADMIN — FINASTERIDE 5 MILLIGRAM(S): 5 TABLET, FILM COATED ORAL at 13:16

## 2018-08-21 RX ADMIN — Medication 325 MILLIGRAM(S): at 13:15

## 2018-08-21 RX ADMIN — Medication 50 MILLIGRAM(S): at 05:22

## 2018-08-21 NOTE — CONSULT NOTE ADULT - ASSESSMENT
Urinary retention and subsequent urinary retention, with jamila urine at present, patient is anticoagulated with history stents and AF, and CV.    Continue Caceres, no traction on Caceres,  hydrate and irrigate Caceres prn with NS and Eunice    Flomax and Proscar   As long as patient anticoagulated will be at risk for continuing hematuria, modification of anticoagulation protocol has to be weighted against Cardiac co-morbidities.

## 2018-08-21 NOTE — PROGRESS NOTE ADULT - SUBJECTIVE AND OBJECTIVE BOX
Dr. Lopez (urology) was called to evaluate hematuria and urinary retention.  Patient with 873 cc on bladder scan.  Dr. Lopez recommended increasing Flomax to 0.8mg, adding Finasteride 5mg and Caceres catheter insertion using 20 [place] cath with balloon inflated to 10cc (irrigate if necessary).  He will see the patient later today for further evaluation.

## 2018-08-21 NOTE — PROGRESS NOTE ADULT - ASSESSMENT
85 yo male with OA, BPH, CAD, Paroxysmal Afib,  presents with long history, 7-8 year history of left knee pain.    1. S/p left TKR, stable  Cont pain control with IV Dilaudid and po Oxycodone prn ,   Cont PT and OT  DVT ppx with Eliquis- as per orthopedics recommendation.     2. Acute Post Op blood loss - Hb/HCt dropped   s/p 3 units of PRBC transfusion    3. HTN - controlled   cont metoprolol with hold parameter and Losartan .  Monitor BP closely.   4. CHARLIE-   Monitor renal function closely.  Avoid Nephrotoxins.       5. BPH cont Cardura , flomax added on 8/21.    6. CAD stable ,cont ASA and metoprolol and statin.     7. PAF , stable cont metoprolol and Eliquis.    8. HLD cont statin    9. Hx of TIA cont ASA, Statin.      DIS- ZAINAB.

## 2018-08-21 NOTE — PROGRESS NOTE ADULT - SUBJECTIVE AND OBJECTIVE BOX
Post Op Day # 4    SUBJECTIVE    85yo Male status post left TKR .   Patient is alert and comfortable.    Pain is controlled with current pain regimen.  Denies nausea, vomiting, chest pain, shortness of breath, abdominal pain or fever.   I was also called by PT this am due to c/o inability to control his bowel movements. Bowel movement was solid in consistency, brown.  They also noted bloody urine, total of 30cc's collected.  Patient states that he has been unable to prevent himself from soiling the bed this am.     OBJECTIVE    Vital Signs Last 24 Hrs  T(C): 36.7 (21 Aug 2018 07:46), Max: 37.3 (20 Aug 2018 15:40)  T(F): 98 (21 Aug 2018 07:46), Max: 99.1 (20 Aug 2018 15:40)  HR: 52 (21 Aug 2018 07:46) (52 - 66)  BP: 161/70 (21 Aug 2018 07:46) (116/58 - 166/65)  BP(mean): --  RR: 15 (21 Aug 2018 07:46) (15 - 16)  SpO2: 99% (21 Aug 2018 07:46) (93% - 100%)  I&O's Summary    20 Aug 2018 07:01  -  21 Aug 2018 07:00  --------------------------------------------------------  IN: 327 mL / OUT: 750 mL / NET: -423 mL    21 Aug 2018 07:01  -  21 Aug 2018 11:14  --------------------------------------------------------  IN: 0 mL / OUT: 330 mL / NET: -330 mL        Left knee incision  is clean, dry and intact.   No erythema/ No exudate/ No blistering/ ecchymosis noted on medial and lateral aspect of left LE, well demarcated along the distribution of the PAS stocking..   The calf is supple/nontender.   Passive range of motion is acceptable to due postoperative pain.   Sensation to light touch is grossly intact distally.   The lateral cutaneous nerve is intact.   Motor function distally is intact.   No foot drop.   (2+) dorsalis pedis pulse. Capillary refill is less than 2 seconds. No cyanosis.                          8.8<L>  8.43  )-----------( 116<L>    ( 21 Aug 2018 07:11 )             25.8<L>  21 Aug 2018 07:11                        7.2<L>  8.58  )-----------( 101<L>    ( 20 Aug 2018 07:57 )             21.4<L>  20 Aug 2018 07:57    20 Aug 2018 07:57    139    |  107    |  30<H>  ----------------------------<  100<H>  4.2     |  28     |  1.60<H>  19 Aug 2018 20:30    140    |  105    |  34<H>  ----------------------------<  113<H>  4.2     |  28     |  1.84<H>    Ca    8.6        20 Aug 2018 07:57  Ca    8.4        19 Aug 2018 20:30        ASSESSMENT AND PLAN    - Orthopedically stable  - Stool Incontinence? Patient was placed on the commode by PT and was able to complete bowel movement. will continue to monitor for incontinence.  - Hematuria: bladder scan showed 875mL. Urology (Dr. Lopez) called. Recommended placement of menezes catheter and he will f/u.   - DVT prophylaxis: PAS + Eliquis  - Continue physical therapy and occupational therapy  - Weight bearing as tolerated of the left lower extremity with assistance of a walker  - Incentive spirometry encouraged  - Pain control as clinically indicated  - Case d/w Dr. Mora  - Disposition: subacute rehabilitation Post Op Day # 4    SUBJECTIVE    85yo Male status post left TKR .   Patient is alert and comfortable.    Pain is controlled with current pain regimen.  Denies nausea, vomiting, chest pain, shortness of breath, abdominal pain or fever.   I was also called by PT this am due to c/o inability to control his bowel movements. Bowel movement was solid in consistency, brown.  They also noted bloody urine, total of 30cc's collected.  Patient states that he has been unable to prevent himself from soiling the bed this am.     OBJECTIVE    Vital Signs Last 24 Hrs  T(C): 36.7 (21 Aug 2018 07:46), Max: 37.3 (20 Aug 2018 15:40)  T(F): 98 (21 Aug 2018 07:46), Max: 99.1 (20 Aug 2018 15:40)  HR: 52 (21 Aug 2018 07:46) (52 - 66)  BP: 161/70 (21 Aug 2018 07:46) (116/58 - 166/65)  BP(mean): --  RR: 15 (21 Aug 2018 07:46) (15 - 16)  SpO2: 99% (21 Aug 2018 07:46) (93% - 100%)  I&O's Summary    20 Aug 2018 07:01  -  21 Aug 2018 07:00  --------------------------------------------------------  IN: 327 mL / OUT: 750 mL / NET: -423 mL    21 Aug 2018 07:01  -  21 Aug 2018 11:14  --------------------------------------------------------  IN: 0 mL / OUT: 330 mL / NET: -330 mL        Left knee incision  is clean, dry and intact.   No erythema/ No exudate/ No blistering/ ecchymosis noted on medial and lateral aspect of left LE, well demarcated along the distribution of the PAS stocking..   The calf is supple/nontender.   Passive range of motion is acceptable to due postoperative pain.   Sensation to light touch is grossly intact distally.   The lateral cutaneous nerve is intact.   Motor function distally is intact.   No foot drop.   (2+) dorsalis pedis pulse. Capillary refill is less than 2 seconds. No cyanosis.                          8.8<L>  8.43  )-----------( 116<L>    ( 21 Aug 2018 07:11 )             25.8<L>  21 Aug 2018 07:11                        7.2<L>  8.58  )-----------( 101<L>    ( 20 Aug 2018 07:57 )             21.4<L>  20 Aug 2018 07:57    20 Aug 2018 07:57    139    |  107    |  30<H>  ----------------------------<  100<H>  4.2     |  28     |  1.60<H>  19 Aug 2018 20:30    140    |  105    |  34<H>  ----------------------------<  113<H>  4.2     |  28     |  1.84<H>    Ca    8.6        20 Aug 2018 07:57  Ca    8.4        19 Aug 2018 20:30        ASSESSMENT AND PLAN    - Orthopedically stable  - Stool Incontinence? Patient was placed on the commode by PT and was able to complete bowel movement. will continue to monitor for incontinence.  - Hematuria: bladder scan showed 875mL. Urology (Dr. Lopez) called. Recommended placement of menezes catheter and he will f/u.   - DVT prophylaxis: PAS + Eliquis  - Continue physical therapy and occupational therapy  - Weight bearing as tolerated of the left lower extremity with assistance of a walker  - Incentive spirometry encouraged  - Pain control as clinically indicated  - Case d/w Dr. Mora: will order repeat CBC at 4pm today and in am.  - Disposition: subacute rehabilitation Post Op Day # 4    SUBJECTIVE    87yo Male status post left TKR .   Patient is alert and comfortable.    Pain is controlled with current pain regimen.  Denies nausea, vomiting, chest pain, shortness of breath, abdominal pain or fever.   I was also called by PT this am due to c/o inability to control his bowel movements. Bowel movement was solid in consistency, brown.  They also noted bloody urine, total of 30cc's collected.  Patient states that he has been unable to prevent himself from soiling the bed this am.     OBJECTIVE    Vital Signs Last 24 Hrs  T(C): 36.7 (21 Aug 2018 07:46), Max: 37.3 (20 Aug 2018 15:40)  T(F): 98 (21 Aug 2018 07:46), Max: 99.1 (20 Aug 2018 15:40)  HR: 52 (21 Aug 2018 07:46) (52 - 66)  BP: 161/70 (21 Aug 2018 07:46) (116/58 - 166/65)  BP(mean): --  RR: 15 (21 Aug 2018 07:46) (15 - 16)  SpO2: 99% (21 Aug 2018 07:46) (93% - 100%)  I&O's Summary    20 Aug 2018 07:01  -  21 Aug 2018 07:00  --------------------------------------------------------  IN: 327 mL / OUT: 750 mL / NET: -423 mL    21 Aug 2018 07:01  -  21 Aug 2018 11:14  --------------------------------------------------------  IN: 0 mL / OUT: 330 mL / NET: -330 mL        Left knee incision  is clean, dry and intact.   No erythema/ No exudate/ No blistering/ ecchymosis noted on medial and lateral aspect of left LE, well demarcated along the distribution of the PAS stocking..   The calf is supple/nontender.   Passive range of motion is acceptable to due postoperative pain.   Sensation to light touch is grossly intact distally.   The lateral cutaneous nerve is intact.   Motor function distally is intact.   No foot drop.   (2+) dorsalis pedis pulse. Capillary refill is less than 2 seconds. No cyanosis.                          8.8<L>  8.43  )-----------( 116<L>    ( 21 Aug 2018 07:11 )             25.8<L>  21 Aug 2018 07:11                        7.2<L>  8.58  )-----------( 101<L>    ( 20 Aug 2018 07:57 )             21.4<L>  20 Aug 2018 07:57    20 Aug 2018 07:57    139    |  107    |  30<H>  ----------------------------<  100<H>  4.2     |  28     |  1.60<H>  19 Aug 2018 20:30    140    |  105    |  34<H>  ----------------------------<  113<H>  4.2     |  28     |  1.84<H>    Ca    8.6        20 Aug 2018 07:57  Ca    8.4        19 Aug 2018 20:30        ASSESSMENT AND PLAN    - Orthopedically stable  - Stool Incontinence? Patient was placed on the commode by PT and was able to complete bowel movement. will continue to monitor for incontinence.  - Hematuria: bladder scan showed 873mL. Urology (Dr. Lopez) called. Recommended placement of menezes catheter and he will f/u. Will also increase finasteride dose to 0.8mg as per Dr. Lopez.  - DVT prophylaxis: PAS + Eliquis  - Continue physical therapy and occupational therapy  - Weight bearing as tolerated of the left lower extremity with assistance of a walker  - Incentive spirometry encouraged  - Pain control as clinically indicated  - Case d/w Dr. Mora: will order repeat CBC at 4pm today and in am.  - Disposition: subacute rehabilitation Post Op Day # 4    SUBJECTIVE    85yo Male status post left TKR .   Patient is alert and comfortable.    Pain is controlled with current pain regimen.  Denies nausea, vomiting, chest pain, shortness of breath, abdominal pain or fever.   I was also called by PT this am due to c/o inability to control his bowel movements. Bowel movement was solid in consistency, brown.  They also noted bloody urine, total of 30cc's collected.  Patient states that he has been unable to prevent himself from soiling the bed this am.     OBJECTIVE    Vital Signs Last 24 Hrs  T(C): 36.7 (21 Aug 2018 07:46), Max: 37.3 (20 Aug 2018 15:40)  T(F): 98 (21 Aug 2018 07:46), Max: 99.1 (20 Aug 2018 15:40)  HR: 52 (21 Aug 2018 07:46) (52 - 66)  BP: 161/70 (21 Aug 2018 07:46) (116/58 - 166/65)  BP(mean): --  RR: 15 (21 Aug 2018 07:46) (15 - 16)  SpO2: 99% (21 Aug 2018 07:46) (93% - 100%)  I&O's Summary    20 Aug 2018 07:01  -  21 Aug 2018 07:00  --------------------------------------------------------  IN: 327 mL / OUT: 750 mL / NET: -423 mL    21 Aug 2018 07:01  -  21 Aug 2018 11:14  --------------------------------------------------------  IN: 0 mL / OUT: 330 mL / NET: -330 mL        Left knee incision  is clean, dry and intact.   No erythema/ No exudate/ No blistering/ ecchymosis noted on medial and lateral aspect of left LE, well demarcated along the distribution of the PAS stocking..   The calf is supple/nontender.   Passive range of motion is acceptable to due postoperative pain.   Sensation to light touch is grossly intact distally.   The lateral cutaneous nerve is intact.   Motor function distally is intact.   No foot drop.   (2+) dorsalis pedis pulse. Capillary refill is less than 2 seconds. No cyanosis.                          8.8<L>  8.43  )-----------( 116<L>    ( 21 Aug 2018 07:11 )             25.8<L>  21 Aug 2018 07:11                        7.2<L>  8.58  )-----------( 101<L>    ( 20 Aug 2018 07:57 )             21.4<L>  20 Aug 2018 07:57    20 Aug 2018 07:57    139    |  107    |  30<H>  ----------------------------<  100<H>  4.2     |  28     |  1.60<H>  19 Aug 2018 20:30    140    |  105    |  34<H>  ----------------------------<  113<H>  4.2     |  28     |  1.84<H>    Ca    8.6        20 Aug 2018 07:57  Ca    8.4        19 Aug 2018 20:30        ASSESSMENT AND PLAN    - Orthopedically stable  - Stool Incontinence? Patient was placed on the commode by PT and was able to complete bowel movement. will continue to monitor for incontinence.  - Hematuria: bladder scan showed 873mL. Urology (Dr. Lopez) called. Recommended placement of menezes catheter and he will f/u. Will also increase flomax dose to 0.8mg and add finasteride as per Dr. Lopez.  - DVT prophylaxis: PAS + Eliquis  - Continue physical therapy and occupational therapy  - Weight bearing as tolerated of the left lower extremity with assistance of a walker  - Incentive spirometry encouraged  - Pain control as clinically indicated  - Case d/w Dr. Mora: will order repeat CBC at 4pm today and in am.  - Disposition: subacute rehabilitation Post Op Day # 4    SUBJECTIVE    87yo Male status post left TKR .   Patient is alert and comfortable.    Pain is controlled with current pain regimen.  Denies nausea, vomiting, chest pain, shortness of breath, abdominal pain or fever.   I was also called by PT this am due to c/o inability to control his bowel movements. Bowel movement was solid in consistency, brown.  They also noted bloody urine, total of 30cc's collected.  Patient states that he has been unable to prevent himself from soiling the bed this am.     OBJECTIVE    Vital Signs Last 24 Hrs  T(C): 36.7 (21 Aug 2018 07:46), Max: 37.3 (20 Aug 2018 15:40)  T(F): 98 (21 Aug 2018 07:46), Max: 99.1 (20 Aug 2018 15:40)  HR: 52 (21 Aug 2018 07:46) (52 - 66)  BP: 161/70 (21 Aug 2018 07:46) (116/58 - 166/65)  BP(mean): --  RR: 15 (21 Aug 2018 07:46) (15 - 16)  SpO2: 99% (21 Aug 2018 07:46) (93% - 100%)  I&O's Summary    20 Aug 2018 07:01  -  21 Aug 2018 07:00  --------------------------------------------------------  IN: 327 mL / OUT: 750 mL / NET: -423 mL    21 Aug 2018 07:01  -  21 Aug 2018 11:14  --------------------------------------------------------  IN: 0 mL / OUT: 330 mL / NET: -330 mL        Left knee incision  is clean, dry and intact.   No erythema/ No exudate/ No blistering/ ecchymosis noted on medial and lateral aspect of left LE, well demarcated along the distribution of the PAS stocking..   The calf is supple/nontender.   Passive range of motion is acceptable to due postoperative pain.   Sensation to light touch is grossly intact distally.   The lateral cutaneous nerve is intact.   Motor function distally is intact.   No foot drop.   (2+) dorsalis pedis pulse. Capillary refill is less than 2 seconds. No cyanosis.                          8.8<L>  8.43  )-----------( 116<L>    ( 21 Aug 2018 07:11 )             25.8<L>  21 Aug 2018 07:11                        7.2<L>  8.58  )-----------( 101<L>    ( 20 Aug 2018 07:57 )             21.4<L>  20 Aug 2018 07:57    20 Aug 2018 07:57    139    |  107    |  30<H>  ----------------------------<  100<H>  4.2     |  28     |  1.60<H>  19 Aug 2018 20:30    140    |  105    |  34<H>  ----------------------------<  113<H>  4.2     |  28     |  1.84<H>    Ca    8.6        20 Aug 2018 07:57  Ca    8.4        19 Aug 2018 20:30        ASSESSMENT AND PLAN    - Orthopedically stable  - Stool Incontinence? Patient was placed on the commode by PT and was able to complete bowel movement. will continue to monitor for incontinence.  - Hematuria: bladder scan showed 873mL. Urology (Dr. Lopez) called. Recommended placement of menezes catheter and he will f/u. Will also increase flomax dose to 0.8mg and add finasteride 5mg as per Dr. Lopez.  - DVT prophylaxis: PAS + Eliquis  - Continue physical therapy and occupational therapy  - Weight bearing as tolerated of the left lower extremity with assistance of a walker  - Incentive spirometry encouraged  - Pain control as clinically indicated  - Case d/w Dr. Mora: will order repeat CBC at 4pm today and in am.  - Disposition: subacute rehabilitation

## 2018-08-21 NOTE — PROGRESS NOTE ADULT - SUBJECTIVE AND OBJECTIVE BOX
Patient is a 86y old  Male who presents with a chief complaint of Left TKR for severe left knee osteoarthritis (20 Aug 2018 18:09)      INTERVAL HPI/OVERNIGHT EVENTS:  Pt is seen and examined.  feels more eddie today.  passing urine but some difficulty. will start on flomax.    Pain Location & Control:     MEDICATIONS  (STANDING):  acetaminophen   Tablet. 1000 milliGRAM(s) Oral every 8 hours  apixaban 2.5 milliGRAM(s) Oral every 12 hours  aspirin enteric coated 81 milliGRAM(s) Oral at bedtime  atorvastatin 20 milliGRAM(s) Oral at bedtime  docusate sodium 100 milliGRAM(s) Oral three times a day  doxazosin 1 milliGRAM(s) Oral at bedtime  lactated ringers. 1000 milliLiter(s) (75 mL/Hr) IV Continuous <Continuous>  metoprolol succinate ER 50 milliGRAM(s) Oral daily  pantoprazole    Tablet 40 milliGRAM(s) Oral daily  senna 2 Tablet(s) Oral at bedtime  tamsulosin 0.4 milliGRAM(s) Oral at bedtime    MEDICATIONS  (PRN):  aluminum hydroxide/magnesium hydroxide/simethicone Suspension 30 milliLiter(s) Oral four times a day PRN Indigestion  bisacodyl Suppository 10 milliGRAM(s) Rectal daily PRN If no bowel movement by postoperative day #2  HYDROmorphone  Injectable 0.5 milliGRAM(s) IV Push every 3 hours PRN Severe Pain (7 - 10)  magnesium hydroxide Suspension 30 milliLiter(s) Oral daily PRN Constipation  ondansetron Injectable 4 milliGRAM(s) IV Push every 6 hours PRN Nausea and/or Vomiting  oxyCODONE    IR 5 milliGRAM(s) Oral every 3 hours PRN Mild Pain (1 - 3)  oxyCODONE    IR 10 milliGRAM(s) Oral every 3 hours PRN Moderate Pain (4 - 6)  polyethylene glycol 3350 17 Gram(s) Oral daily PRN Constipation      Allergies    No Known Allergies    Intolerances            Vital Signs Last 24 Hrs  T(C): 36.7 (21 Aug 2018 07:46), Max: 37.3 (20 Aug 2018 15:40)  T(F): 98 (21 Aug 2018 07:46), Max: 99.1 (20 Aug 2018 15:40)  HR: 52 (21 Aug 2018 07:46) (52 - 66)  BP: 161/70 (21 Aug 2018 07:46) (116/58 - 166/65)  BP(mean): --  RR: 15 (21 Aug 2018 07:46) (15 - 16)  SpO2: 99% (21 Aug 2018 07:46) (93% - 100%)        LABS:                        8.8    8.43  )-----------( 116      ( 21 Aug 2018 07:11 )             25.8       Ca    8.6        20 Aug 2018 07:57

## 2018-08-21 NOTE — CONSULT NOTE ADULT - SUBJECTIVE AND OBJECTIVE BOX
85 yo male with OA, BPH, CAD, Paroxysmal Afib,  presents with long history, 7-8 year history of left knee pain.   S/p left TKR. Noted increasing Cr trend coupled with hemoglobin drop to 7.7;   No distress states voiding well; menezes in place;   On ARB at home; received Losartan 100mg earlier today.     "    PAST MEDICAL & SURGICAL HISTORY:  Osteoarthritis  Urinary frequency  Nocturia: x1  BPH (benign prostatic hyperplasia)  Legally blind in right [place], as [place] in [place]  TIA (transient ischemic attack): x2 2008 ansd 2011 no residual deficits  Glaucoma: right eye, no eye drops, only sees minimal shadows in the eye  CAD (coronary artery disease): slight MI x2 as per pt  Hyperlipidemia, unspecified hyperlipidemia  Essential hypertension  Paroxysmal atrial fibrillation  S/P cataract surgery, left: 2010  S/P colonoscopy with polypectomy: 2013, benign polyps  S/P cataract surgery, right: 2010  S/P coronary artery stent placement: x1 in 2011    FAMILY HISTORY:  Family history of osteoarthritis (Sibling)  Family history of liver cancer  Family history of MI (myocardial infarction)    Allergies    No Known Allergies    Intolerances      Home Medications:  apixaban 2.5 mg oral tablet: 1 tab(s) orally 2 times a day (17 Aug 2018 08:46)  aspirin 81 mg oral tablet: 1 tab(s) orally once a day (at bedtime) (17 Aug 2018 08:46)  Benicar HCT 40 mg-25 mg oral tablet: 1 tab(s) orally once a day (in the evening) (17 Aug 2018 08:46)  doxazosin 1 mg oral tablet: 1 tab(s) orally once a day (17 Aug 2018 08:46)  Lipitor 20 mg oral tablet: 1 tab(s) orally once a day (in the evening) (17 Aug 2018 08:46)  Pepcid AC Maximum Strength 20 mg oral tablet: 1 tab(s) orally 2 times a day(x2 preoperatively) (17 Aug 2018 15:32)  Toprol-XL 50 mg oral tablet, extended release: 1 tab(s) orally once a day (17 Aug 2018 08:46)    MEDICATIONS  (STANDING):  acetaminophen   Tablet. 1000 milliGRAM(s) Oral every 8 hours  apixaban 2.5 milliGRAM(s) Oral every 12 hours  aspirin enteric coated 81 milliGRAM(s) Oral at bedtime  atorvastatin 20 milliGRAM(s) Oral at bedtime  docusate sodium 100 milliGRAM(s) Oral three times a day  doxazosin 1 milliGRAM(s) Oral at bedtime  lactated ringers. 1000 milliLiter(s) (75 mL/Hr) IV Continuous <Continuous>  metoprolol succinate ER 50 milliGRAM(s) Oral daily  pantoprazole    Tablet 40 milliGRAM(s) Oral daily  senna 2 Tablet(s) Oral at bedtime    MEDICATIONS  (PRN):  aluminum hydroxide/magnesium hydroxide/simethicone Suspension 30 milliLiter(s) Oral four times a day PRN Indigestion  bisacodyl Suppository 10 milliGRAM(s) Rectal daily PRN If no bowel movement by postoperative day #2  HYDROmorphone  Injectable 0.5 milliGRAM(s) IV Push every 3 hours PRN Severe Pain (7 - 10)  magnesium hydroxide Suspension 30 milliLiter(s) Oral daily PRN Constipation  ondansetron Injectable 4 milliGRAM(s) IV Push every 6 hours PRN Nausea and/or Vomiting  oxyCODONE    IR 5 milliGRAM(s) Oral every 3 hours PRN Mild Pain (1 - 3)  oxyCODONE    IR 10 milliGRAM(s) Oral every 3 hours PRN Moderate Pain (4 - 6)  polyethylene glycol 3350 17 Gram(s) Oral daily PRN Constipation    Vital Signs Last 24 Hrs  T(C): 36.9 (19 Aug 2018 15:36), Max: 37 (18 Aug 2018 19:00)  T(F): 98.4 (19 Aug 2018 15:36), Max: 98.6 (18 Aug 2018 19:00)  HR: 54 (19 Aug 2018 15:36) (54 - 64)  BP: 121/56 (19 Aug 2018 15:36) (115/66 - 148/68)  BP(mean): --  RR: 18 (19 Aug 2018 15:36) (16 - 20)  SpO2: 96% (19 Aug 2018 15:36) (94% - 100%)    Daily     Daily     08-18-18 @ 07:01  -  08-19-18 @ 07:00  --------------------------------------------------------  IN: 0 mL / OUT: 2860 mL / NET: -2860 mL    08-19-18 @ 07:01  -  08-19-18 @ 16:57  --------------------------------------------------------  IN: 750 mL / OUT: 0 mL / NET: 750 mL      CAPILLARY BLOOD GLUCOSE        PHYSICAL EXAM:      T(C): 36.9 (08-19-18 @ 15:36), Max: 37 (08-18-18 @ 19:00)  HR: 54 (08-19-18 @ 15:36) (54 - 64)  BP: 121/56 (08-19-18 @ 15:36) (115/66 - 148/68)  RR: 18 (08-19-18 @ 15:36) (16 - 20)  SpO2: 96% (08-19-18 @ 15:36) (94% - 100%)  Wt(kg): --  Respiratory: clear anteriorly, decreased BS at bases  Cardiovascular: S1 S2  Gastrointestinal: soft NT ND +BS  Extremities:   1 edema              08-19    140  |  107  |  36<H>  ----------------------------<  110<H>  4.7   |  26  |  1.85<H>    Ca    8.6      19 Aug 2018 06:56                            7.7    10.99 )-----------( 104      ( 19 Aug 2018 12:02 )             22.6         Assessment and Plan    CHARLIE , mild, CKD 3; suspected pre renal azotemia;   Hold ARB for now;   Will follow.
CHIEF COMPLAINT: Hematuria, urinary retention    HISTORY OF PRESENT ILLNESS :Patient is a 86y old  Male who presents with a chief complaint of Left TKR for severe left knee osteoarthritis (20 Aug 2018 18:09)  Had cath post op for retention removed, developed gross hematuria and cath replaced today with patient in UR, initially drained tea colored urine them red urine with clots, now after irrigation draining jamila urine and no clots.  Flomax and Proscar have been ordered Patient has been on Eliquis and ASA.    Patient is followed by Dr. Davidson, his Urologist for many years.  Has history  of elevated PSA, and prior record of incomplete bladder emptying ( cc one year ago.    During hospitalization patient's anemia has been addressed with blood transfusions, and he has evidence of mild  altered renal function    PAST MEDICAL & SURGICAL HISTORY:   Osteoarthritis  Urinary frequency  Nocturia: x1  BPH (benign prostatic hyperplasia)  Legally blind in right eye, as defined in [place]  TIA (transient ischemic attack): x2 2008 ansd 2011 no residual deficits  Glaucoma: right eye, no eye drops, only sees minimal shadows in the eye  CAD (coronary artery disease): slight MI x2 as per pt  Hyperlipidemia, unspecified hyperlipidemia  Essential hypertension  Paroxysmal atrial fibrillation  S/P cataract surgery, left: 2010  S/P colonoscopy with polypectomy: 2013, benign polyps  S/P cataract surgery, right: 2010  S/P coronary artery stent placement: x1 in 2011      REVIEW OF SYSTEMS:    CONSTITUTIONAL: No weakness, fevers or chills  EYES/ENT: No visual changes;  No vertigo or throat pain   NECK: No pain or stiffness  RESPIRATORY: No cough, wheezing, hemoptysis; No shortness of breath  CARDIOVASCULAR: No chest pain or palpitations  GASTROINTESTINAL: No abdominal or epigastric pain. No nausea, vomiting, or hematemesis; No diarrhea or constipation. No melena or hematochezia.  GENITOURINARY: per HPI  NEUROLOGICAL: No numbness or weakness  SKIN: No itching, burning, rashes, or lesions   .    MEDICATIONS  (STANDING):  acetaminophen   Tablet. 1000 milliGRAM(s) Oral every 8 hours  apixaban 2.5 milliGRAM(s) Oral every 12 hours  aspirin enteric coated 81 milliGRAM(s) Oral at bedtime  atorvastatin 20 milliGRAM(s) Oral at bedtime  docusate sodium 100 milliGRAM(s) Oral three times a day  doxazosin 1 milliGRAM(s) Oral at bedtime  ferrous    sulfate 325 milliGRAM(s) Oral daily  finasteride 5 milliGRAM(s) Oral daily  lactated ringers. 1000 milliLiter(s) (75 mL/Hr) IV Continuous <Continuous>  metoprolol succinate ER 50 milliGRAM(s) Oral daily  pantoprazole    Tablet 40 milliGRAM(s) Oral daily  senna 2 Tablet(s) Oral at bedtime  tamsulosin 0.8 milliGRAM(s) Oral at bedtime    MEDICATIONS  (PRN):  aluminum hydroxide/magnesium hydroxide/simethicone Suspension 30 milliLiter(s) Oral four times a day PRN Indigestion  bisacodyl Suppository 10 milliGRAM(s) Rectal daily PRN If no bowel movement by postoperative day #2  HYDROmorphone  Injectable 0.5 milliGRAM(s) IV Push every 3 hours PRN Severe Pain (7 - 10)  magnesium hydroxide Suspension 30 milliLiter(s) Oral daily PRN Constipation  ondansetron Injectable 4 milliGRAM(s) IV Push every 6 hours PRN Nausea and/or Vomiting  oxyCODONE    IR 5 milliGRAM(s) Oral every 3 hours PRN Mild Pain (1 - 3)  oxyCODONE    IR 10 milliGRAM(s) Oral every 3 hours PRN Moderate Pain (4 - 6)  polyethylene glycol 3350 17 Gram(s) Oral daily PRN Constipation      Allergies    No Known Allergies    Intolerances        SOCIAL HISTORY:    FAMILY HISTORY:  Family history of osteoarthritis (Sibling)  Family history of liver cancer  Family history of MI (myocardial infarction)      Vital Signs Last 24 Hrs  T(C): 36.8 (21 Aug 2018 15:17), Max: 37.2 (20 Aug 2018 20:51)  T(F): 98.3 (21 Aug 2018 15:17), Max: 99 (20 Aug 2018 20:51)  HR: 53 (21 Aug 2018 15:17) (52 - 66)  BP: 160/62 (21 Aug 2018 15:17) (148/64 - 166/65)  BP(mean): --  RR: 16 (21 Aug 2018 15:17) (15 - 16)  SpO2: 99% (21 Aug 2018 15:17) (97% - 100%)    PHYSICAL EXAM:    Constitutional: NAD, well-developed, resting comfortably in bed.  HEENT: SCHUYLER, EOMI, Normal Hearing,  Neck: No LAD, No JVD  Respiratory: CTAB   Cardiovascular: S1 and S2, RRR, no M/G/R  Abd: BS+, soft, NT/ND, No CVAT  : Normal circumcised phallus, patent  meatus with catheter drainign jamila de la rosa and no clots at present, ,bilateral descended testes, no masses  EDGAR: not examined because of traction for LTK  Extremities: right no edema, left edematous s/p TKR  Neurological: A/O x 3, no focal deficits  Psychiatric: Normal mood, normal affect        LABS:                        9.7    9.04  )-----------( 111      ( 21 Aug 2018 17:22 )             28.0     08-21    140  |  104  |  24<H>  ----------------------------<  116<H>  4.1   |  28  |  1.34<H>    Ca    8.5      21 Aug 2018 17:22          Urine Culture:   Hematocrit: 28.0 % (08-21 @ 17:22)  Hemoglobin: 9.7 g/dL (08-21 @ 17:22)  Hematocrit: 25.8 % (08-21 @ 07:11)  Hemoglobin: 8.8 g/dL (08-21 @ 07:11)  Hematocrit: 21.4 % (08-20 @ 07:57)  Hemoglobin: 7.2 g/dL (08-20 @ 07:57)  Hemoglobin: 7.5 g/dL (08-19 @ 20:30)  Hematocrit: 22.3 % (08-19 @ 20:30)      RADIOLOGY & ADDITIONAL STUDIES:
Patient is a 86y old  Male who presents with a chief complaint of     HPI: 85 yo male with OA, BPH, CAD, Paroxysmal Afib,  presents with long history, 7-8 year history of left knee pain. Pain was treated conservatively with tylenol and NSAIDs. Denies ever receiving PT or any injections into the knee. Pain currently 0-2/10 at rest and and 4/10 with daily activity. Pain at its worst with prolonged standing. Pain mildly relieved with stretching. Denies using any other pain relief measures. Also reports intermittent swelling in the knee.     PAST MEDICAL & SURGICAL HISTORY:  Osteoarthritis  Urinary frequency  Nocturia: x1  BPH (benign prostatic hyperplasia)  Legally blind in right eye, as [place] in [place]  TIA (transient ischemic attack): x2 2008 ansd 2011 no residual deficits  Glaucoma: right eye, no eye drops, only sees minimal shadows in the eye  CAD (coronary artery disease): slight MI x2 as per pt  Hyperlipidemia, unspecified hyperlipidemia  Essential hypertension  Paroxysmal atrial fibrillation  S/P cataract surgery, left: 2010  S/P colonoscopy with polypectomy: 2013, benign polyps  S/P cataract surgery, right: 2010  S/P coronary artery stent placement: x1 in 2011      REVIEW OF SYSTEMS:    CONSTITUTIONAL: No fever, weight loss, or fatigue  EYES: No eye pain, visual disturbances, or discharge  ENMT:  No difficulty hearing, tinnitus, vertigo; No sinus or throat pain  NECK: No pain or stiffness  BREASTS: No pain, masses, or nipple discharge  RESPIRATORY: No cough, wheezing, chills or hemoptysis; No shortness of breath  CARDIOVASCULAR: No chest pain, palpitations, dizziness, or leg swelling  GASTROINTESTINAL: No abdominal or epigastric pain. No nausea, vomiting, or hematemesis; No diarrhea or constipation. No melena or hematochezia.  GENITOURINARY: No dysuria, frequency, hematuria, or incontinence  NEUROLOGICAL: No headaches, memory loss, loss of strength, numbness, or tremors  SKIN: No itching, burning, rashes, or lesions   LYMPH NODES: No enlarged glands  ENDOCRINE: No heat or cold intolerance; No hair loss  MUSCULOSKELETAL: No muscle or back pain  PSYCHIATRIC: No depression, anxiety, mood swings, or difficulty sleeping  HEME/LYMPH: No easy bruising, or bleeding gums  ALLERGY AND IMMUNOLOGIC: No hives or eczema      MEDICATIONS  (STANDING):    MEDICATIONS  (PRN):      Allergies    No Known Allergies    Intolerances        SOCIAL HISTORY:  Alochol: hx of alcohol abuse 50 years ago   Smoking: Former smoker, quitted 15 y ago, PPY  52.   Drug Use: Denied  Marital Status:           FAMILY HISTORY:  Family history of osteoarthritis (Sibling)  Family history of liver cancer (Mother)  Family history of MI (myocardial infarction) (Father)      Vital Signs Last 24 Hrs  T(C): 36.5 (17 Aug 2018 08:31), Max: 36.5 (17 Aug 2018 08:31)  T(F): 97.7 (17 Aug 2018 08:31), Max: 97.7 (17 Aug 2018 08:31)  HR: 53 (17 Aug 2018 08:31) (53 - 53)  BP: 101/38 (17 Aug 2018 11:55) (101/38 - 136/80)  BP(mean): --  RR: 17 (17 Aug 2018 11:55) (12 - 17)  SpO2: 100% (17 Aug 2018 11:55) (97% - 100%)    PHYSICAL EXAM:    GENERAL: NAD, well-groomed, well-developed  HEAD:  Atraumatic, Normocephalic  EYES: EOMI, PERRLA, conjunctiva and sclera clear  ENMT: No tonsillar erythema, exudates, or enlargement; Moist mucous membranes, Good dentition, No lesions  NECK: Supple, No JVD, Normal thyroid  NERVOUS SYSTEM:  Alert & Oriented X3, Good concentration; Motor Strength 5/5 B/L upper and lower extremities; DTRs 2+ intact and symmetric  CHEST/LUNG: Clear to percussion bilaterally; No rales, rhonchi, wheezing, or rubs  HEART: Regular rate and rhythm; No murmurs, rubs, or gallops  ABDOMEN: Soft, Nontender, Nondistended; Bowel sounds present  EXTREMITIES:  2+ Peripheral Pulses, No clubbing, cyanosis, or edema  LYMPH: No lymphadenopathy noted  SKIN: No rashes or lesions  INCISION: intact     LABS:              CAPILLARY BLOOD GLUCOSE          RADIOLOGY & ADDITIONAL STUDIES:
REASON FOR CONSULT:  Anticoagulation  , hx PAF    CHIEF COMPLAINT: had left knee replacement     HPI: 86 year old male with hx of hypertension CAD PCI  more than 7 years ago , PAF HLD  who underwent left knee replacement  who developed post op blood loss anemia , patient received  multiple units of PRBC , dropped his hemoglobin after recieving eliquis dose ,   patient blood pressure is stable , patient denies any chest pain or shortness of breath . Patient denies recent palpitations , patient remained in sinus rhythm while on monitor     patient had normal chemical myocardial perfusion scan  8/10/18  echo  8/6/18  EF 60%           PAST MEDICAL & SURGICAL HISTORY:  Osteoarthritis  Urinary frequency  Nocturia: x1  BPH (benign prostatic hyperplasia)  Legally blind in right eye, as defined in [place]  TIA (transient ischemic attack): x2 2008 ansd 2011 no residual deficits  Glaucoma: right eye, no eye drops, only sees minimal shadows in the eye  CAD (coronary artery disease): slight MI x2 as per pt  Hyperlipidemia, unspecified hyperlipidemia  Essential hypertension  Paroxysmal atrial fibrillation  S/P cataract surgery, left: 2010  S/P colonoscopy with polypectomy: 2013, benign polyps  S/P cataract surgery, right: 2010  S/P coronary artery stent placement: x1 in 2011      Allergies    No Known Allergies    Intolerances        SOCIAL HISTORY: former smoker     FAMILY HISTORY:  Family history of osteoarthritis (Sibling)  Family history of liver cancer  Family history of MI (myocardial infarction)      MEDICATIONS:  MEDICATIONS  (STANDING):  acetaminophen   Tablet. 1000 milliGRAM(s) Oral every 8 hours  apixaban 2.5 milliGRAM(s) Oral every 12 hours  aspirin enteric coated 81 milliGRAM(s) Oral at bedtime  atorvastatin 20 milliGRAM(s) Oral at bedtime  docusate sodium 100 milliGRAM(s) Oral three times a day  doxazosin 1 milliGRAM(s) Oral at bedtime  ferrous    sulfate 325 milliGRAM(s) Oral daily  finasteride 5 milliGRAM(s) Oral daily  lactated ringers. 1000 milliLiter(s) (75 mL/Hr) IV Continuous <Continuous>  metoprolol succinate ER 50 milliGRAM(s) Oral daily  pantoprazole    Tablet 40 milliGRAM(s) Oral daily  senna 2 Tablet(s) Oral at bedtime  tamsulosin 0.8 milliGRAM(s) Oral at bedtime    MEDICATIONS  (PRN):  aluminum hydroxide/magnesium hydroxide/simethicone Suspension 30 milliLiter(s) Oral four times a day PRN Indigestion  bisacodyl Suppository 10 milliGRAM(s) Rectal daily PRN If no bowel movement by postoperative day #2  HYDROmorphone  Injectable 0.5 milliGRAM(s) IV Push every 3 hours PRN Severe Pain (7 - 10)  magnesium hydroxide Suspension 30 milliLiter(s) Oral daily PRN Constipation  ondansetron Injectable 4 milliGRAM(s) IV Push every 6 hours PRN Nausea and/or Vomiting  oxyCODONE    IR 5 milliGRAM(s) Oral every 3 hours PRN Mild Pain (1 - 3)  oxyCODONE    IR 10 milliGRAM(s) Oral every 3 hours PRN Moderate Pain (4 - 6)  polyethylene glycol 3350 17 Gram(s) Oral daily PRN Constipation      REVIEW OF SYSTEMS:    as above other wise   All other review of systems is negative unless indicated above    Vital Signs Last 24 Hrs  T(C): 36.7 (21 Aug 2018 07:46), Max: 37.3 (20 Aug 2018 15:40)  T(F): 98 (21 Aug 2018 07:46), Max: 99.1 (20 Aug 2018 15:40)  HR: 52 (21 Aug 2018 07:46) (52 - 66)  BP: 161/70 (21 Aug 2018 07:46) (134/56 - 166/65)  BP(mean): --  RR: 15 (21 Aug 2018 07:46) (15 - 16)  SpO2: 99% (21 Aug 2018 07:46) (97% - 100%)    I&O's Summary    20 Aug 2018 07:01  -  21 Aug 2018 07:00  --------------------------------------------------------  IN: 327 mL / OUT: 750 mL / NET: -423 mL    21 Aug 2018 07:01  -  21 Aug 2018 14:42  --------------------------------------------------------  IN: 0 mL / OUT: 2030 mL / NET: -2030 mL        PHYSICAL EXAM:    Constitutional: NAD, awake and alert, well-developed  HEENT: PERR, EOMI,  No oral cyananosis.  Neck:  supple,  No JVD  Respiratory: Breath sounds are clear bilaterally, No wheezing, rales or rhonchi  Cardiovascular: S1 and S2, regular rate and rhythm, ESm   Gastrointestinal: Bowel Sounds present, soft, nontender.   Extremities: No peripheral edema. No clubbing or cyanosis.  Vascular: 2+ peripheral pulses  Neurological: A/O x 3, no focal deficits  Musculoskeletal: no calf tenderness.  left knee surgical scar , echymosis   Skin: No rashes.      LABS: All Labs Reviewed:                        8.8    8.43  )-----------( 116      ( 21 Aug 2018 07:11 )             25.8                         7.2    8.58  )-----------( 101      ( 20 Aug 2018 07:57 )             21.4                         7.5    9.46  )-----------( 103      ( 19 Aug 2018 20:30 )             22.3     20 Aug 2018 07:57    139    |  107    |  30     ----------------------------<  100    4.2     |  28     |  1.60   19 Aug 2018 20:30    140    |  105    |  34     ----------------------------<  113    4.2     |  28     |  1.84   19 Aug 2018 06:56    140    |  107    |  36     ----------------------------<  110    4.7     |  26     |  1.85     Ca    8.6        20 Aug 2018 07:57  Ca    8.4        19 Aug 2018 20:30  Ca    8.6        19 Aug 2018 06:56            Blood Culture:         RADIOLOGY/EKG:    Monitor sinus rhythm , sinus bradycardia

## 2018-08-21 NOTE — CONSULT NOTE ADULT - PROBLEM SELECTOR RECOMMENDATION 9
hx of PAF , currently in sinus rhythm , continue BB ,   monitor hemoglobin , continue Eliquis if hemoglobin is stable ,   if drop in hemoglobin would consider holding AC until hemoglobin is stable for more than 48 hours     spoke to  patients cardiologist

## 2018-08-21 NOTE — PROGRESS NOTE ADULT - SUBJECTIVE AND OBJECTIVE BOX
Case d/w Dr. Mora. Considering stopping anticoagulation pending repeat CBC. D/W Dr. Burgos. Given patient's cardiac history, he prefers cardiac input prior to stopping anticoagulation. I called Dr. Cantu's group for cardiology consult.

## 2018-08-21 NOTE — PROGRESS NOTE ADULT - SUBJECTIVE AND OBJECTIVE BOX
Subjective: C/o voiding issues since clif murillo yest      MEDICATIONS  (STANDING):  acetaminophen   Tablet. 1000 milliGRAM(s) Oral every 8 hours  apixaban 2.5 milliGRAM(s) Oral every 12 hours  aspirin enteric coated 81 milliGRAM(s) Oral at bedtime  atorvastatin 20 milliGRAM(s) Oral at bedtime  docusate sodium 100 milliGRAM(s) Oral three times a day  doxazosin 1 milliGRAM(s) Oral at bedtime  lactated ringers. 1000 milliLiter(s) (75 mL/Hr) IV Continuous <Continuous>  metoprolol succinate ER 50 milliGRAM(s) Oral daily  pantoprazole    Tablet 40 milliGRAM(s) Oral daily  senna 2 Tablet(s) Oral at bedtime  tamsulosin 0.4 milliGRAM(s) Oral at bedtime    MEDICATIONS  (PRN):  aluminum hydroxide/magnesium hydroxide/simethicone Suspension 30 milliLiter(s) Oral four times a day PRN Indigestion  bisacodyl Suppository 10 milliGRAM(s) Rectal daily PRN If no bowel movement by postoperative day #2  HYDROmorphone  Injectable 0.5 milliGRAM(s) IV Push every 3 hours PRN Severe Pain (7 - 10)  magnesium hydroxide Suspension 30 milliLiter(s) Oral daily PRN Constipation  ondansetron Injectable 4 milliGRAM(s) IV Push every 6 hours PRN Nausea and/or Vomiting  oxyCODONE    IR 5 milliGRAM(s) Oral every 3 hours PRN Mild Pain (1 - 3)  oxyCODONE    IR 10 milliGRAM(s) Oral every 3 hours PRN Moderate Pain (4 - 6)  polyethylene glycol 3350 17 Gram(s) Oral daily PRN Constipation          T(C): 36.7 (08-21-18 @ 07:46), Max: 37.3 (08-20-18 @ 15:40)  HR: 52 (08-21-18 @ 07:46) (52 - 66)  BP: 161/70 (08-21-18 @ 07:46) (116/58 - 166/65)  RR: 15 (08-21-18 @ 07:46) (15 - 16)  SpO2: 99% (08-21-18 @ 07:46) (93% - 100%)  Wt(kg): --        I&O's Detail    20 Aug 2018 07:01  -  21 Aug 2018 07:00  --------------------------------------------------------  IN:    Packed Red Blood Cells: 327 mL  Total IN: 327 mL    OUT:    Voided: 750 mL  Total OUT: 750 mL    Total NET: -423 mL               PHYSICAL EXAM:    GENERAL: comfortable  EYES: EOMI, PERRLA, conjunctiva and sclera clear  NECK: Supple, no inc in JVP  CHEST/LUNG: Clear  HEART: S1S2  ABDOMEN: Soft, Nontender, Nondistended; Bowel sounds present  EXTREMITIES:  post L TKR. Trace edema on L  NEURO: no asterixis        LABS:  CBC Full  -  ( 21 Aug 2018 07:11 )  WBC Count : 8.43 K/uL  Hemoglobin : 8.8 g/dL  Hematocrit : 25.8 %  Platelet Count - Automated : 116 K/uL  Mean Cell Volume : 92.1 fl  Mean Cell Hemoglobin : 31.4 pg  Mean Cell Hemoglobin Concentration : 34.1 gm/dL  Auto Neutrophil # : x  Auto Lymphocyte # : x  Auto Monocyte # : x  Auto Eosinophil # : x  Auto Basophil # : x  Auto Neutrophil % : x  Auto Lymphocyte % : x  Auto Monocyte % : x  Auto Eosinophil % : x  Auto Basophil % : x    08-20    139  |  107  |  30<H>  ----------------------------<  100<H>  4.2   |  28  |  1.60<H>    Ca    8.6      20 Aug 2018 07:57        Impression:  * CHARLIE -- pre-renal  * CKD -- Cr 1.4 in past  * Acute post op anemia  Recommendations:   * Cont to watch off ARB  * D/c IVFs  * Check bladder scan. Re-insert Caceres if PVR is > 400cc

## 2018-08-22 VITALS
DIASTOLIC BLOOD PRESSURE: 69 MMHG | HEART RATE: 61 BPM | RESPIRATION RATE: 18 BRPM | SYSTOLIC BLOOD PRESSURE: 161 MMHG | OXYGEN SATURATION: 96 % | TEMPERATURE: 98 F

## 2018-08-22 LAB
ANION GAP SERPL CALC-SCNC: 3 MMOL/L — LOW (ref 5–17)
BUN SERPL-MCNC: 24 MG/DL — HIGH (ref 7–23)
CALCIUM SERPL-MCNC: 8.8 MG/DL — SIGNIFICANT CHANGE UP (ref 8.4–10.5)
CHLORIDE SERPL-SCNC: 106 MMOL/L — SIGNIFICANT CHANGE UP (ref 96–108)
CO2 SERPL-SCNC: 29 MMOL/L — SIGNIFICANT CHANGE UP (ref 22–31)
CREAT SERPL-MCNC: 1.39 MG/DL — HIGH (ref 0.5–1.3)
GLUCOSE BLDC GLUCOMTR-MCNC: 102 MG/DL — HIGH (ref 70–99)
GLUCOSE SERPL-MCNC: 99 MG/DL — SIGNIFICANT CHANGE UP (ref 70–99)
HCT VFR BLD CALC: 26.5 % — LOW (ref 39–50)
HGB BLD-MCNC: 9 G/DL — LOW (ref 13–17)
MCHC RBC-ENTMCNC: 32 PG — SIGNIFICANT CHANGE UP (ref 27–34)
MCHC RBC-ENTMCNC: 34 GM/DL — SIGNIFICANT CHANGE UP (ref 32–36)
MCV RBC AUTO: 94.3 FL — SIGNIFICANT CHANGE UP (ref 80–100)
NRBC # BLD: 0 /100 WBCS — SIGNIFICANT CHANGE UP (ref 0–0)
PLATELET # BLD AUTO: 124 K/UL — LOW (ref 150–400)
POTASSIUM SERPL-MCNC: 4.3 MMOL/L — SIGNIFICANT CHANGE UP (ref 3.5–5.3)
POTASSIUM SERPL-SCNC: 4.3 MMOL/L — SIGNIFICANT CHANGE UP (ref 3.5–5.3)
RBC # BLD: 2.81 M/UL — LOW (ref 4.2–5.8)
RBC # FLD: 14.1 % — SIGNIFICANT CHANGE UP (ref 10.3–14.5)
SODIUM SERPL-SCNC: 138 MMOL/L — SIGNIFICANT CHANGE UP (ref 135–145)
SURGICAL PATHOLOGY FINAL REPORT - CH: SIGNIFICANT CHANGE UP
WBC # BLD: 7.7 K/UL — SIGNIFICANT CHANGE UP (ref 3.8–10.5)
WBC # FLD AUTO: 7.7 K/UL — SIGNIFICANT CHANGE UP (ref 3.8–10.5)

## 2018-08-22 PROCEDURE — 99233 SBSQ HOSP IP/OBS HIGH 50: CPT

## 2018-08-22 PROCEDURE — 93005 ELECTROCARDIOGRAM TRACING: CPT

## 2018-08-22 PROCEDURE — 36415 COLL VENOUS BLD VENIPUNCTURE: CPT

## 2018-08-22 PROCEDURE — C1889: CPT

## 2018-08-22 PROCEDURE — P9016: CPT

## 2018-08-22 PROCEDURE — 97535 SELF CARE MNGMENT TRAINING: CPT

## 2018-08-22 PROCEDURE — 80048 BASIC METABOLIC PNL TOTAL CA: CPT

## 2018-08-22 PROCEDURE — 97116 GAIT TRAINING THERAPY: CPT

## 2018-08-22 PROCEDURE — 82962 GLUCOSE BLOOD TEST: CPT

## 2018-08-22 PROCEDURE — 97161 PT EVAL LOW COMPLEX 20 MIN: CPT

## 2018-08-22 PROCEDURE — 36430 TRANSFUSION BLD/BLD COMPNT: CPT

## 2018-08-22 PROCEDURE — 85027 COMPLETE CBC AUTOMATED: CPT

## 2018-08-22 PROCEDURE — 88305 TISSUE EXAM BY PATHOLOGIST: CPT

## 2018-08-22 PROCEDURE — 86900 BLOOD TYPING SEROLOGIC ABO: CPT

## 2018-08-22 PROCEDURE — 73562 X-RAY EXAM OF KNEE 3: CPT

## 2018-08-22 PROCEDURE — 85018 HEMOGLOBIN: CPT

## 2018-08-22 PROCEDURE — 85014 HEMATOCRIT: CPT

## 2018-08-22 PROCEDURE — C1713: CPT

## 2018-08-22 PROCEDURE — 97530 THERAPEUTIC ACTIVITIES: CPT

## 2018-08-22 PROCEDURE — C1776: CPT

## 2018-08-22 PROCEDURE — 97110 THERAPEUTIC EXERCISES: CPT

## 2018-08-22 PROCEDURE — 88311 DECALCIFY TISSUE: CPT

## 2018-08-22 PROCEDURE — 86850 RBC ANTIBODY SCREEN: CPT

## 2018-08-22 PROCEDURE — 86923 COMPATIBILITY TEST ELECTRIC: CPT

## 2018-08-22 PROCEDURE — 94664 DEMO&/EVAL PT USE INHALER: CPT

## 2018-08-22 PROCEDURE — 97165 OT EVAL LOW COMPLEX 30 MIN: CPT

## 2018-08-22 PROCEDURE — 86901 BLOOD TYPING SEROLOGIC RH(D): CPT

## 2018-08-22 RX ORDER — OXYCODONE HYDROCHLORIDE 5 MG/1
1 TABLET ORAL
Qty: 0 | Refills: 0 | COMMUNITY
Start: 2018-08-22

## 2018-08-22 RX ORDER — OLMESARTAN MEDOXOMIL-HYDROCHLOROTHIAZIDE 25; 40 MG/1; MG/1
1 TABLET, FILM COATED ORAL
Qty: 0 | Refills: 0 | COMMUNITY

## 2018-08-22 RX ORDER — DOCUSATE SODIUM 100 MG
1 CAPSULE ORAL
Qty: 0 | Refills: 0 | COMMUNITY
Start: 2018-08-22

## 2018-08-22 RX ORDER — METOPROLOL TARTRATE 50 MG
1 TABLET ORAL
Qty: 0 | Refills: 0 | COMMUNITY
Start: 2018-08-22

## 2018-08-22 RX ORDER — POLYETHYLENE GLYCOL 3350 17 G/17G
17 POWDER, FOR SOLUTION ORAL
Qty: 0 | Refills: 0 | COMMUNITY
Start: 2018-08-22

## 2018-08-22 RX ORDER — SENNA PLUS 8.6 MG/1
2 TABLET ORAL
Qty: 0 | Refills: 0 | COMMUNITY
Start: 2018-08-22

## 2018-08-22 RX ORDER — FERROUS SULFATE 325(65) MG
1 TABLET ORAL
Qty: 0 | Refills: 0 | COMMUNITY
Start: 2018-08-22

## 2018-08-22 RX ORDER — FINASTERIDE 5 MG/1
1 TABLET, FILM COATED ORAL
Qty: 0 | Refills: 0 | COMMUNITY
Start: 2018-08-22

## 2018-08-22 RX ORDER — TAMSULOSIN HYDROCHLORIDE 0.4 MG/1
2 CAPSULE ORAL
Qty: 0 | Refills: 0 | COMMUNITY
Start: 2018-08-22

## 2018-08-22 RX ORDER — METOPROLOL TARTRATE 50 MG
25 TABLET ORAL DAILY
Qty: 0 | Refills: 0 | Status: DISCONTINUED | OUTPATIENT
Start: 2018-08-22 | End: 2018-08-22

## 2018-08-22 RX ORDER — PANTOPRAZOLE SODIUM 20 MG/1
1 TABLET, DELAYED RELEASE ORAL
Qty: 0 | Refills: 0 | COMMUNITY
Start: 2018-08-22

## 2018-08-22 RX ADMIN — Medication 325 MILLIGRAM(S): at 12:51

## 2018-08-22 RX ADMIN — Medication 100 MILLIGRAM(S): at 12:51

## 2018-08-22 RX ADMIN — APIXABAN 2.5 MILLIGRAM(S): 2.5 TABLET, FILM COATED ORAL at 08:02

## 2018-08-22 RX ADMIN — FINASTERIDE 5 MILLIGRAM(S): 5 TABLET, FILM COATED ORAL at 12:51

## 2018-08-22 RX ADMIN — Medication 1000 MILLIGRAM(S): at 08:02

## 2018-08-22 RX ADMIN — Medication 100 MILLIGRAM(S): at 05:51

## 2018-08-22 RX ADMIN — Medication 50 MILLIGRAM(S): at 05:51

## 2018-08-22 RX ADMIN — PANTOPRAZOLE SODIUM 40 MILLIGRAM(S): 20 TABLET, DELAYED RELEASE ORAL at 12:51

## 2018-08-22 NOTE — PROGRESS NOTE ADULT - ASSESSMENT
87 yo male with OA, BPH, CAD, Paroxysmal Afib,  presents with long history, 7-8 year history of left knee pain.    1. S/p left TKR, stable  Cont pain control with IV Dilaudid and po Oxycodone prn ,   Cont PT and OT  DVT ppx with Eliquis- as per orthopedics recommendation.     2. Acute Post Op blood loss - Hb/HCt dropped   s/p 3 units of PRBC transfusion  HH stable   d/c planning     3. HTN - controlled   cont metoprolol with hold parameter and Losartan .  Monitor BP closely.   4. CHARLIE-   Monitor renal function closely.  Avoid Nephrotoxins.       5. BPH cont Cardura , flomax added on 8/21.    6. CAD stable ,cont ASA and metoprolol and statin.     7. PAF , stable cont metoprolol and Eliquis.    8. HLD cont statin    9. Hx of TIA cont ASA, Statin.      DIS- [facility]. 85 yo male with OA, BPH, CAD, Paroxysmal Afib,  presents with long history, 7-8 year history of left knee pain.    1. S/p left TKR, stable  Cont pain control with IV Dilaudid and po Oxycodone prn ,   Cont PT and OT  DVT ppx with Eliquis- as per orthopedics recommendation.     2. Acute Post Op blood loss - Hb/HCt dropped   s/p 3 units of PRBC transfusion  HH stable   d/c planning     3. HTN - controlled   cont metoprolol with hold parameter and Losartan .  Monitor BP closely.   4. CHARLIE-   Monitor renal function closely.  Avoid Nephrotoxins.       5. BPH cont Cardura , flomax added on 8/21.    6. CAD stable ,cont ASA and metoprolol and statin.   2 second pause, discussed with cardio will lower dose to toprol to 25 mg  conitnue to moniotr     7. PAF , stable cont metoprolol and Eliquis.    8. HLD cont statin    9. Hx of TIA cont ASA, Statin.      DIS- ZAINAB.

## 2018-08-22 NOTE — PROGRESS NOTE ADULT - SUBJECTIVE AND OBJECTIVE BOX
Progress Note:  Urine grossly clear following bout hematuria post cath removal and subsequent PVR.  cath replaced and urien has cleared.    PAST MEDICAL & SURGICAL HISTORY:  Osteoarthritis  Urinary frequency  Nocturia: x1  BPH (benign prostatic hyperplasia)  Legally blind in right eye, as defined in [place]  TIA (transient ischemic attack): x2 2008 ansd 2011 no residual deficits  Glaucoma: right eye, no eye drops, only sees minimal shadows in the eye  CAD (coronary artery disease): slight MI x2 as per pt  Hyperlipidemia, unspecified hyperlipidemia  Essential hypertension  Paroxysmal atrial fibrillation  S/P cataract surgery, left: 2010  S/P colonoscopy with polypectomy: 2013, benign polyps  S/P cataract surgery, right: 2010  S/P coronary artery stent placement: x1 in 2011        MEDICATIONS  (STANDING):  acetaminophen   Tablet. 1000 milliGRAM(s) Oral every 8 hours  apixaban 2.5 milliGRAM(s) Oral every 12 hours  aspirin enteric coated 81 milliGRAM(s) Oral at bedtime  atorvastatin 20 milliGRAM(s) Oral at bedtime  docusate sodium 100 milliGRAM(s) Oral three times a day  doxazosin 1 milliGRAM(s) Oral at bedtime  ferrous    sulfate 325 milliGRAM(s) Oral daily  finasteride 5 milliGRAM(s) Oral daily  metoprolol succinate ER 25 milliGRAM(s) Oral daily  pantoprazole    Tablet 40 milliGRAM(s) Oral daily  senna 2 Tablet(s) Oral at bedtime  tamsulosin 0.8 milliGRAM(s) Oral at bedtime    MEDICATIONS  (PRN):  aluminum hydroxide/magnesium hydroxide/simethicone Suspension 30 milliLiter(s) Oral four times a day PRN Indigestion  bisacodyl Suppository 10 milliGRAM(s) Rectal daily PRN If no bowel movement by postoperative day #2  HYDROmorphone  Injectable 0.5 milliGRAM(s) IV Push every 3 hours PRN Severe Pain (7 - 10)  magnesium hydroxide Suspension 30 milliLiter(s) Oral daily PRN Constipation  ondansetron Injectable 4 milliGRAM(s) IV Push every 6 hours PRN Nausea and/or Vomiting  oxyCODONE    IR 5 milliGRAM(s) Oral every 3 hours PRN Mild Pain (1 - 3)  oxyCODONE    IR 10 milliGRAM(s) Oral every 3 hours PRN Moderate Pain (4 - 6)  polyethylene glycol 3350 17 Gram(s) Oral daily PRN Constipation      Allergies    No Known Allergies    Intolerances        SOCIAL HISTORY:    FAMILY HISTORY:  Family history of osteoarthritis (Sibling)  Family history of liver cancer  Family history of MI (myocardial infarction)      Vital Signs Last 24 Hrs  T(C): 37.1 (22 Aug 2018 11:25), Max: 37.1 (22 Aug 2018 11:25)  T(F): 98.7 (22 Aug 2018 11:25), Max: 98.7 (22 Aug 2018 11:25)  HR: 72 (22 Aug 2018 11:25) (50 - 72)  BP: 138/67 (22 Aug 2018 11:25) (138/67 - 170/67)  BP(mean): --  RR: 18 (22 Aug 2018 11:25) (16 - 18)  SpO2: 95% (22 Aug 2018 11:25) (95% - 99%)    PHYSICAL EXAM:    Constitutional: NAD, well-developed    Asymptomatic, AO  Abd: BS+, soft, NT/ND, No CVAT  : Normal phallus, with Caceres draining grossly clear urine  Extremities: No peripheral edema right, s/p LTKR - with edema    LABS:                        9.0    7.70  )-----------( 124      ( 22 Aug 2018 07:15 )             26.5     08-22    138  |  106  |  24<H>  ----------------------------<  99  4.3   |  29  |  1.39<H>    Ca    8.8      22 Aug 2018 07:15          Urine Culture:   Hemoglobin: 9.0 g/dL (08-22 @ 07:15)  Hematocrit: 26.5 % (08-22 @ 07:15)  Hematocrit: 28.0 % (08-21 @ 17:22)  Hemoglobin: 9.7 g/dL (08-21 @ 17:22)  Hematocrit: 25.8 % (08-21 @ 07:11)  Hemoglobin: 8.8 g/dL (08-21 @ 07:11)      RADIOLOGY & ADDITIONAL STUDIES:

## 2018-08-22 NOTE — PROGRESS NOTE ADULT - SUBJECTIVE AND OBJECTIVE BOX
Ortho PA - Post Op Check POD#5- S/P  left TKR      Pt alert and comfortable with no complaints, pain controlled with Acetaminophen PO, Dilaudid IV, Oxycodone PO.  Denies nausea, CP, SOB, headache, numbness/tingling to extremities.     Vital Signs Last 24 Hrs  T(C): 36.9 (08-22-18 @ 07:49), Max: 36.9 (08-22-18 @ 07:49)  T(F): 98.5 (08-22-18 @ 07:49), Max: 98.5 (08-22-18 @ 07:49)  HR: 58 (08-22-18 @ 07:49) (57 - 58)  BP: 163/70 (08-22-18 @ 07:49) (163/66 - 163/70)  BP(mean): --  RR: 16 (08-22-18 @ 07:49) (16 - 16)  SpO2: 96% (08-22-18 @ 07:49) (96% - 96%)  I&O's Detail    21 Aug 2018 07:01  -  22 Aug 2018 07:00  --------------------------------------------------------  IN:  Total IN: 0 mL    OUT:    Indwelling Catheter - Urethral: 3150 mL    Voided: 330 mL  Total OUT: 3480 mL    Total NET: -3480 mL        I&O's Summary    21 Aug 2018 07:01  -  22 Aug 2018 07:00  --------------------------------------------------------  IN: 0 mL / OUT: 3480 mL / NET: -3480 mL                                9.0    7.70  )-----------( 124      ( 22 Aug 2018 07:15 )             26.5        08-22    138  |  106  |  24<H>  ----------------------------<  99  4.3   |  29  |  1.39<H>    Ca    8.8      22 Aug 2018 07:15      MEDICATIONS:  acetaminophen   Tablet. 1000 milliGRAM(s) Oral every 8 hours  aluminum hydroxide/magnesium hydroxide/simethicone Suspension 30 milliLiter(s) Oral four times a day PRN  apixaban 2.5 milliGRAM(s) Oral every 12 hours  aspirin enteric coated 81 milliGRAM(s) Oral at bedtime  atorvastatin 20 milliGRAM(s) Oral at bedtime  bisacodyl Suppository 10 milliGRAM(s) Rectal daily PRN  docusate sodium 100 milliGRAM(s) Oral three times a day  doxazosin 1 milliGRAM(s) Oral at bedtime  ferrous    sulfate 325 milliGRAM(s) Oral daily  finasteride 5 milliGRAM(s) Oral daily  HYDROmorphone  Injectable 0.5 milliGRAM(s) IV Push every 3 hours PRN  lactated ringers. 1000 milliLiter(s) IV Continuous <Continuous>  magnesium hydroxide Suspension 30 milliLiter(s) Oral daily PRN  metoprolol succinate ER 50 milliGRAM(s) Oral daily  ondansetron Injectable 4 milliGRAM(s) IV Push every 6 hours PRN  oxyCODONE    IR 5 milliGRAM(s) Oral every 3 hours PRN  oxyCODONE    IR 10 milliGRAM(s) Oral every 3 hours PRN  pantoprazole    Tablet 40 milliGRAM(s) Oral daily  polyethylene glycol 3350 17 Gram(s) Oral daily PRN  senna 2 Tablet(s) Oral at bedtime  tamsulosin 0.8 milliGRAM(s) Oral at bedtime    Anticoagulation:  apixaban 2.5 milliGRAM(s) Oral every 12 hours  aspirin enteric coated 81 milliGRAM(s) Oral at bedtime    Pain medications:   acetaminophen   Tablet. 1000 milliGRAM(s) Oral every 8 hours  HYDROmorphone  Injectable 0.5 milliGRAM(s) IV Push every 3 hours PRN  ondansetron Injectable 4 milliGRAM(s) IV Push every 6 hours PRN  oxyCODONE    IR 5 milliGRAM(s) Oral every 3 hours PRN  oxyCODONE    IR 10 milliGRAM(s) Oral every 3 hours PRN        PE:  Left knee: primary surgical bandage dry and intact, dressing removed. Feet mobile and sensate.  EHLs/ant.tibs. 5/5  PAS on LE's. Calves soft and nontender.    A/P: Ortho stable post-op day #5  - Continue post-op orders; pain management with PO Acetaminophen, IV Dilaudid, PO Oxycodone  - Check labs today and in A.M.  - DVT prevention with Apixaban and ASA  - PT/OT for OOB, full WBAT  - Medical consult  - Discharge planning home vs. [facility]: to be decided, as per social work.  - Will continue to monitor closely with attendings. Ortho PA - Post Op DAY #5- S/P  left TKR      Pt alert and comfortable with no complaints, pain controlled with PO Acetaminophen and Oxycodone as needed.  Denies nausea, CP, SOB, headache, numbness/tingling to extremities.     Vital Signs Last 24 Hrs  T(C): 36.9 (08-22-18 @ 07:49), Max: 36.9 (08-22-18 @ 07:49)  T(F): 98.5 (08-22-18 @ 07:49), Max: 98.5 (08-22-18 @ 07:49)  HR: 58 (08-22-18 @ 07:49) (57 - 58)  BP: 163/70 (08-22-18 @ 07:49) (163/66 - 163/70)  BP(mean): --  RR: 16 (08-22-18 @ 07:49) (16 - 16)  SpO2: 96% (08-22-18 @ 07:49) (96% - 96%)  I&O's Detail    21 Aug 2018 07:01  -  22 Aug 2018 07:00  --------------------------------------------------------  IN:  Total IN: 0 mL    OUT:    Indwelling Catheter - Urethral: 3150 mL    Voided: 330 mL  Total OUT: 3480 mL    Total NET: -3480 mL        I&O's Summary    21 Aug 2018 07:01  -  22 Aug 2018 07:00  --------------------------------------------------------  IN: 0 mL / OUT: 3480 mL / NET: -3480 mL                                9.0    7.70  )-----------( 124      ( 22 Aug 2018 07:15 )             26.5        08-22    138  |  106  |  24<H>  ----------------------------<  99  4.3   |  29  |  1.39<H>    Ca    8.8      22 Aug 2018 07:15      MEDICATIONS:  acetaminophen   Tablet. 1000 milliGRAM(s) Oral every 8 hours  aluminum hydroxide/magnesium hydroxide/simethicone Suspension 30 milliLiter(s) Oral four times a day PRN  apixaban 2.5 milliGRAM(s) Oral every 12 hours  aspirin enteric coated 81 milliGRAM(s) Oral at bedtime  atorvastatin 20 milliGRAM(s) Oral at bedtime  bisacodyl Suppository 10 milliGRAM(s) Rectal daily PRN  docusate sodium 100 milliGRAM(s) Oral three times a day  doxazosin 1 milliGRAM(s) Oral at bedtime  ferrous    sulfate 325 milliGRAM(s) Oral daily  finasteride 5 milliGRAM(s) Oral daily  HYDROmorphone  Injectable 0.5 milliGRAM(s) IV Push every 3 hours PRN  lactated ringers. 1000 milliLiter(s) IV Continuous <Continuous>  magnesium hydroxide Suspension 30 milliLiter(s) Oral daily PRN  metoprolol succinate ER 50 milliGRAM(s) Oral daily  ondansetron Injectable 4 milliGRAM(s) IV Push every 6 hours PRN  oxyCODONE    IR 5 milliGRAM(s) Oral every 3 hours PRN  oxyCODONE    IR 10 milliGRAM(s) Oral every 3 hours PRN  pantoprazole    Tablet 40 milliGRAM(s) Oral daily  polyethylene glycol 3350 17 Gram(s) Oral daily PRN  senna 2 Tablet(s) Oral at bedtime  tamsulosin 0.8 milliGRAM(s) Oral at bedtime    Anticoagulation:  apixaban 2.5 milliGRAM(s) Oral every 12 hours  aspirin enteric coated 81 milliGRAM(s) Oral at bedtime    Pain medications:   acetaminophen   Tablet. 1000 milliGRAM(s) Oral every 8 hours  HYDROmorphone  Injectable 0.5 milliGRAM(s) IV Push every 3 hours PRN  ondansetron Injectable 4 milliGRAM(s) IV Push every 6 hours PRN  oxyCODONE    IR 5 milliGRAM(s) Oral every 3 hours PRN  oxyCODONE    IR 10 milliGRAM(s) Oral every 3 hours PRN        PE:  Left knee: primary surgical bandage removed, surgical wound clean, dry and intact. Feet mobile and sensate.  EHLs/ant.tibs. 5/5  PAS on LE's. Calves soft and nontender.    A/P: Ortho stable post-op day #5 s/p left TKR  - Continue post-op orders; pain management with above meds  - Labs stable, recheck in AM  - DVT prevention with Apixaban, ASA and PAS  - Continue PT/OT for ambulation and ROM left knee  - Dr. Mendiola to continue with medical care  - Discharge planning home vs. [facility]: to be decided, as per social work. Ortho PA - Post Op DAY #5- S/P  left TKR      Pt alert and comfortable with no complaints, pain controlled with PO Acetaminophen and Oxycodone as needed.  Denies nausea, CP, SOB, headache, numbness/tingling to extremities.     Vital Signs Last 24 Hrs  T(C): 36.9 (08-22-18 @ 07:49), Max: 36.9 (08-22-18 @ 07:49)  T(F): 98.5 (08-22-18 @ 07:49), Max: 98.5 (08-22-18 @ 07:49)  HR: 58 (08-22-18 @ 07:49) (57 - 58)  BP: 163/70 (08-22-18 @ 07:49) (163/66 - 163/70)  BP(mean): --  RR: 16 (08-22-18 @ 07:49) (16 - 16)  SpO2: 96% (08-22-18 @ 07:49) (96% - 96%)  I&O's Detail    21 Aug 2018 07:01  -  22 Aug 2018 07:00  --------------------------------------------------------  IN:  Total IN: 0 mL    OUT:    Indwelling Catheter - Urethral: 3150 mL    Voided: 330 mL  Total OUT: 3480 mL    Total NET: -3480 mL        I&O's Summary    21 Aug 2018 07:01  -  22 Aug 2018 07:00  --------------------------------------------------------  IN: 0 mL / OUT: 3480 mL / NET: -3480 mL                                9.0    7.70  )-----------( 124      ( 22 Aug 2018 07:15 )             26.5        08-22    138  |  106  |  24<H>  ----------------------------<  99  4.3   |  29  |  1.39<H>    Ca    8.8      22 Aug 2018 07:15      MEDICATIONS:  acetaminophen   Tablet. 1000 milliGRAM(s) Oral every 8 hours  aluminum hydroxide/magnesium hydroxide/simethicone Suspension 30 milliLiter(s) Oral four times a day PRN  apixaban 2.5 milliGRAM(s) Oral every 12 hours  aspirin enteric coated 81 milliGRAM(s) Oral at bedtime  atorvastatin 20 milliGRAM(s) Oral at bedtime  bisacodyl Suppository 10 milliGRAM(s) Rectal daily PRN  docusate sodium 100 milliGRAM(s) Oral three times a day  doxazosin 1 milliGRAM(s) Oral at bedtime  ferrous    sulfate 325 milliGRAM(s) Oral daily  finasteride 5 milliGRAM(s) Oral daily  HYDROmorphone  Injectable 0.5 milliGRAM(s) IV Push every 3 hours PRN  lactated ringers. 1000 milliLiter(s) IV Continuous <Continuous>  magnesium hydroxide Suspension 30 milliLiter(s) Oral daily PRN  metoprolol succinate ER 50 milliGRAM(s) Oral daily  ondansetron Injectable 4 milliGRAM(s) IV Push every 6 hours PRN  oxyCODONE    IR 5 milliGRAM(s) Oral every 3 hours PRN  oxyCODONE    IR 10 milliGRAM(s) Oral every 3 hours PRN  pantoprazole    Tablet 40 milliGRAM(s) Oral daily  polyethylene glycol 3350 17 Gram(s) Oral daily PRN  senna 2 Tablet(s) Oral at bedtime  tamsulosin 0.8 milliGRAM(s) Oral at bedtime    Anticoagulation:  apixaban 2.5 milliGRAM(s) Oral every 12 hours  aspirin enteric coated 81 milliGRAM(s) Oral at bedtime    Pain medications:   acetaminophen   Tablet. 1000 milliGRAM(s) Oral every 8 hours  HYDROmorphone  Injectable 0.5 milliGRAM(s) IV Push every 3 hours PRN  ondansetron Injectable 4 milliGRAM(s) IV Push every 6 hours PRN  oxyCODONE    IR 5 milliGRAM(s) Oral every 3 hours PRN  oxyCODONE    IR 10 milliGRAM(s) Oral every 3 hours PRN        PE:  Left knee: Prineo tape over surgical wound clean, dry and intact. Motor and sensory grossly intact LE's  PAS on LE's. Calves soft and nontender.    A/P: Ortho stable post-op day #5 s/p left TKR  - Continue post-op orders; pain management with above meds  - Labs stable; asymptomatic anemia  - DVT prevention with Apixaban for 12 days, then Ecotrin 81mg po every 12 hours for 4 additional weeks  - Continue PT/OT for ambulation and ROM left knee  - Dr. Mendiola medically cleared patient for discharge to rehab today; leave with Caceres due to urinary retention/TOV in rehab  -Cardiology note appreciated.

## 2018-08-22 NOTE — PROGRESS NOTE ADULT - SUBJECTIVE AND OBJECTIVE BOX
Patient seen in follow up for CHARLIE on CKD due to pre/post-renal causes. Caceres was re-inserted yesterday with 1500 cc PVR by bladder scan. Feels weak, not in pain, no SOB. Good appetite. States that he will be discharged to [facility] in [place]. He follows with urologist Dr. Davidson as outpatient.    Osteoarthritis  Urinary frequency  Nocturia  BPH (benign prostatic hyperplasia)  Legally blind in right eye, as defined in [place]  TIA (transient ischemic attack)  Glaucoma  CAD (coronary artery disease)  Hyperlipidemia, unspecified hyperlipidemia  Essential hypertension  Paroxysmal atrial fibrillation    MEDICATIONS  (STANDING):  acetaminophen   Tablet. 1000 milliGRAM(s) Oral every 8 hours  apixaban 2.5 milliGRAM(s) Oral every 12 hours  aspirin enteric coated 81 milliGRAM(s) Oral at bedtime  atorvastatin 20 milliGRAM(s) Oral at bedtime  docusate sodium 100 milliGRAM(s) Oral three times a day  doxazosin 1 milliGRAM(s) Oral at bedtime  ferrous    sulfate 325 milliGRAM(s) Oral daily  finasteride 5 milliGRAM(s) Oral daily  lactated ringers. 1000 milliLiter(s) (75 mL/Hr) IV Continuous <Continuous>  metoprolol succinate ER 25 milliGRAM(s) Oral daily  pantoprazole    Tablet 40 milliGRAM(s) Oral daily  senna 2 Tablet(s) Oral at bedtime  tamsulosin 0.8 milliGRAM(s) Oral at bedtime    MEDICATIONS  (PRN):  aluminum hydroxide/magnesium hydroxide/simethicone Suspension 30 milliLiter(s) Oral four times a day PRN Indigestion  bisacodyl Suppository 10 milliGRAM(s) Rectal daily PRN If no bowel movement by postoperative day #2  HYDROmorphone  Injectable 0.5 milliGRAM(s) IV Push every 3 hours PRN Severe Pain (7 - 10)  magnesium hydroxide Suspension 30 milliLiter(s) Oral daily PRN Constipation  ondansetron Injectable 4 milliGRAM(s) IV Push every 6 hours PRN Nausea and/or Vomiting  oxyCODONE    IR 5 milliGRAM(s) Oral every 3 hours PRN Mild Pain (1 - 3)  oxyCODONE    IR 10 milliGRAM(s) Oral every 3 hours PRN Moderate Pain (4 - 6)  polyethylene glycol 3350 17 Gram(s) Oral daily PRN Constipation    T(C): 37.1 (08-22-18 @ 11:25), Max: 37.1 (08-22-18 @ 11:25)  HR: 72 (08-22-18 @ 11:25) (50 - 72)  BP: 138/67 (08-22-18 @ 11:25) (138/67 - 170/67)  RR: 18 (08-22-18 @ 11:25) (15 - 18)  SpO2: 95% (08-22-18 @ 11:25) (95% - 99%)  Wt(kg): --  I&O's Detail    21 Aug 2018 07:01  -  22 Aug 2018 07:00  --------------------------------------------------------  IN:  Total IN: 0 mL    OUT:    Indwelling Catheter - Urethral: 3150 mL    Voided: 330 mL  Total OUT: 3480 mL    Total NET: -3480 mL    PHYSICAL EXAM:  General: NAD, AAO x3  Respiratory: b/l air entry, clear  Cardiovascular: S1 S2 reg  Gastrointestinal: soft, BS present  Extremities:  trace edema    CBC Full  -  ( 22 Aug 2018 07:15 )  WBC Count : 7.70 K/uL  Hemoglobin : 9.0 g/dL  Hematocrit : 26.5 %  Platelet Count - Automated : 124 K/uL  Mean Cell Volume : 94.3 fl  Mean Cell Hemoglobin : 32.0 pg  Mean Cell Hemoglobin Concentration : 34.0 gm/dL  Auto Neutrophil # : x  Auto Lymphocyte # : x  Auto Monocyte # : x  Auto Eosinophil # : x  Auto Basophil # : x  Auto Neutrophil % : x  Auto Lymphocyte % : x  Auto Monocyte % : x  Auto Eosinophil % : x  Auto Basophil % : x    08-22    138  |  106  |  24<H>  ----------------------------<  99  4.3   |  29  |  1.39<H>    Ca    8.8      22 Aug 2018 07:15      Sodium, Serum: 138 (08-22 @ 07:15)  Sodium, Serum: 140 (08-21 @ 17:22)  Sodium, Serum: 139 (08-20 @ 07:57)  Sodium, Serum: 140 (08-19 @ 20:30)    Creatinine, Serum: 1.39 (08-22 @ 07:15)  Creatinine, Serum: 1.34 (08-21 @ 17:22)  Creatinine, Serum: 1.60 (08-20 @ 07:57)  Creatinine, Serum: 1.84 (08-19 @ 20:30)    Potassium, Serum: 4.3 (08-22 @ 07:15)  Potassium, Serum: 4.1 (08-21 @ 17:22)  Potassium, Serum: 4.2 (08-20 @ 07:57)  Potassium, Serum: 4.2 (08-19 @ 20:30)    Hemoglobin: 9.0 (08-22 @ 07:15)  Hemoglobin: 9.7 (08-21 @ 17:22)  Hemoglobin: 8.8 (08-21 @ 07:11)  Hemoglobin: 7.2 (08-20 @ 07:57)  Hemoglobin: 7.5 (08-19 @ 20:30)

## 2018-08-22 NOTE — PROGRESS NOTE ADULT - SUBJECTIVE AND OBJECTIVE BOX
PCP:    REQUESTING PHYSICIAN:      CHIEF COMPLAINT:  Patient is a 86y old  Male who presents with a chief complaint of Left TKR for severe left knee osteoarthritis (20 Aug 2018 18:09)  HPI: 86 year old male with hx of hypertension CAD PCI  more than 7 years ago , PAF HLD  who underwent left knee replacement  who developed post op blood loss anemia , patient received  multiple units of PRBC , dropped his hemoglobin after recieving eliquis dose ,   patient blood pressure is stable , patient denies any chest pain or shortness of breath . Patient denies recent palpitations , patient remained in sinus rhythm while on monitor   patient had normal chemical myocardial perfusion scan  8/10/18  echo  8/6/18  EF 60%           PAST MEDICAL & SURGICAL HISTORY:  Osteoarthritis  Urinary frequency  Nocturia: x1  BPH (benign prostatic hyperplasia)  Legally blind in right eye, as defined in [place]  TIA (transient ischemic attack): x2 2008 ansd 2011 no residual deficits  Glaucoma: right eye, no eye drops, only sees minimal shadows in the eye  CAD (coronary artery disease): slight MI x2 as per pt  Hyperlipidemia, unspecified hyperlipidemia  Essential hypertension  Paroxysmal atrial fibrillation  S/P cataract surgery, left: 2010  S/P colonoscopy with polypectomy: 2013, benign polyps  S/P cataract surgery, right: 2010  S/P coronary artery stent placement: x1 in 2011          MEDICATIONS:    MEDICATIONS  (STANDING):  acetaminophen   Tablet. 1000 milliGRAM(s) Oral every 8 hours  apixaban 2.5 milliGRAM(s) Oral every 12 hours  aspirin enteric coated 81 milliGRAM(s) Oral at bedtime  atorvastatin 20 milliGRAM(s) Oral at bedtime  docusate sodium 100 milliGRAM(s) Oral three times a day  doxazosin 1 milliGRAM(s) Oral at bedtime  ferrous    sulfate 325 milliGRAM(s) Oral daily  finasteride 5 milliGRAM(s) Oral daily  lactated ringers. 1000 milliLiter(s) (75 mL/Hr) IV Continuous <Continuous>  metoprolol succinate ER 50 milliGRAM(s) Oral daily  pantoprazole    Tablet 40 milliGRAM(s) Oral daily  senna 2 Tablet(s) Oral at bedtime  tamsulosin 0.8 milliGRAM(s) Oral at bedtime    MEDICATIONS  (PRN):  aluminum hydroxide/magnesium hydroxide/simethicone Suspension 30 milliLiter(s) Oral four times a day PRN Indigestion  bisacodyl Suppository 10 milliGRAM(s) Rectal daily PRN If no bowel movement by postoperative day #2  HYDROmorphone  Injectable 0.5 milliGRAM(s) IV Push every 3 hours PRN Severe Pain (7 - 10)  magnesium hydroxide Suspension 30 milliLiter(s) Oral daily PRN Constipation  ondansetron Injectable 4 milliGRAM(s) IV Push every 6 hours PRN Nausea and/or Vomiting  oxyCODONE    IR 5 milliGRAM(s) Oral every 3 hours PRN Mild Pain (1 - 3)  oxyCODONE    IR 10 milliGRAM(s) Oral every 3 hours PRN Moderate Pain (4 - 6)  polyethylene glycol 3350 17 Gram(s) Oral daily PRN Constipation      REVIEW OF SYSTEMS:    CONSTITUTIONAL: No weakness, fevers or chills  EYES/ENT: No visual changes;  No vertigo or throat pain   NECK: No pain or stiffness  RESPIRATORY: No cough, wheezing, hemoptysis; No shortness of breath  CARDIOVASCULAR: No chest pain or palpitations  GASTROINTESTINAL: No abdominal or epigastric pain. No nausea, vomiting, or hematemesis; No diarrhea or constipation. No melena or hematochezia.  GENITOURINARY: No dysuria, frequency or hematuria  NEUROLOGICAL: No numbness or weakness  SKIN: No itching, burning, rashes, or lesions   All other review of systems is negative unless indicated above    Vital Signs Last 24 Hrs  T(C): 36.9 (22 Aug 2018 07:49), Max: 36.9 (22 Aug 2018 07:49)  T(F): 98.5 (22 Aug 2018 07:49), Max: 98.5 (22 Aug 2018 07:49)  HR: 58 (22 Aug 2018 07:49) (50 - 58)  BP: 163/70 (22 Aug 2018 07:49) (149/70 - 170/67)  BP(mean): --  RR: 16 (22 Aug 2018 07:49) (16 - 16)  SpO2: 96% (22 Aug 2018 07:49) (96% - 99%)    I&O's Summary    21 Aug 2018 07:01  -  22 Aug 2018 07:00  --------------------------------------------------------  IN: 0 mL / OUT: 3480 mL / NET: -3480 mL        PHYSICAL EXAM:    Constitutional: NAD, awake and alert, well-developed  HEENT: PERR, EOMI,  No oral cyananosis.  Neck:  supple,  No JVD  Respiratory: Breath sounds are clear bilaterally, No wheezing, rales or rhonchi  Cardiovascular: S1 and S2, regular rate and rhythm, no Murmurs, gallops or rubs  Gastrointestinal: Bowel Sounds present, soft, nontender.   Extremities: No peripheral edema. No clubbing or cyanosis.  Vascular: 2+ peripheral pulses  Neurological: A/O x 3, no focal deficits  Musculoskeletal: no calf tenderness.  Skin: No rashes.      LABS: All Labs Reviewed:          CBC Full  -  ( 22 Aug 2018 07:15 )  WBC Count : 7.70 K/uL  Hemoglobin : 9.0 g/dL  Hematocrit : 26.5 %  Platelet Count - Automated : 124 K/uL  Mean Cell Volume : 94.3 fl  Mean Cell Hemoglobin : 32.0 pg  Mean Cell Hemoglobin Concentration : 34.0 gm/dL  Auto Neutrophil # : x  Auto Lymphocyte # : x  Auto Monocyte # : x  Auto Eosinophil # : x  Auto Basophil # : x  Auto Neutrophil % : x  Auto Lymphocyte % : x  Auto Monocyte % : x  Auto Eosinophil % : x  Auto Basophil % : x      08-22    138  |  106  |  24<H>  ----------------------------<  99  4.3   |  29  |  1.39<H>    Ca    8.8      22 Aug 2018 07:15              I&O's Summary    21 Aug 2018 07:01  -  22 Aug 2018 07:00  --------------------------------------------------------  IN: 0 mL / OUT: 3480 mL / NET: -3480 mL        CAPILLARY BLOOD GLUCOSE      POCT Blood Glucose.: 102 mg/dL (22 Aug 2018 08:00)                                   9.0    7.70  )-----------( 124      ( 22 Aug 2018 07:15 )             26.5                        Blood Culture:         RADIOLOGY/EKG:    Ventricular Rate 55 BPM    Atrial Rate 55 BPM    P-R Interval 158 ms    QRS Duration 94 ms    Q-T Interval 460 ms    QTC Calculation(Bezet) 440 ms    P Axis 49 degrees    R Axis 16 degrees    T Axis 28 degrees    Diagnosis Line Sinus bradycardia  Otherwise normal ECG

## 2018-08-22 NOTE — PROGRESS NOTE ADULT - ASSESSMENT
Hematuria resolved, UR - continue Caceres and Tamsulosin/Finasteride, stable for transfer to Rehab  standpoint.  Patient will f/u with his Urologist - Dr. Kendall

## 2018-08-22 NOTE — PROGRESS NOTE ADULT - SUBJECTIVE AND OBJECTIVE BOX
Patient is a 86y old  Male who presents with a chief complaint of Left TKR for severe left knee osteoarthritis (20 Aug 2018 18:09)        HPI:87 yo male with OA, BPH, CAD, Paroxysmal Afib,  presents with long history, 7-8 year history of left knee pain.      SUBJECTIVE & OBJECTIVE: Pt seen and examined at bedside. no acute complaints     PHYSICAL EXAM:  T(C): 36.9 (08-22-18 @ 07:49), Max: 36.9 (08-22-18 @ 07:49)  HR: 58 (08-22-18 @ 07:49) (50 - 58)  BP: 163/70 (08-22-18 @ 07:49) (149/70 - 170/67)  RR: 16 (08-22-18 @ 07:49) (16 - 16)  SpO2: 96% (08-22-18 @ 07:49) (96% - 99%)  Wt(kg): --   GENERAL: NAD, well-groomed, well-developed  HEAD:  Atraumatic, Normocephalic  EYES: EOMI, PERRLA, conjunctiva and sclera clear  ENMT: Moist mucous membranes  NECK: Supple, No JVD  NERVOUS SYSTEM:  Alert & Oriented X3,  CHEST/LUNG: decrease air entry at the bases   HEART: Regular rate and rhythm; No murmurs, rubs, or gallops  ABDOMEN: Soft, Nontender, Nondistended; Bowel sounds present  EXTREMITIES:  2+ Peripheral Pulses, No clubbing, cyanosis, or edema        MEDICATIONS  (STANDING):  acetaminophen   Tablet. 1000 milliGRAM(s) Oral every 8 hours  apixaban 2.5 milliGRAM(s) Oral every 12 hours  aspirin enteric coated 81 milliGRAM(s) Oral at bedtime  atorvastatin 20 milliGRAM(s) Oral at bedtime  docusate sodium 100 milliGRAM(s) Oral three times a day  doxazosin 1 milliGRAM(s) Oral at bedtime  ferrous    sulfate 325 milliGRAM(s) Oral daily  finasteride 5 milliGRAM(s) Oral daily  lactated ringers. 1000 milliLiter(s) (75 mL/Hr) IV Continuous <Continuous>  metoprolol succinate ER 50 milliGRAM(s) Oral daily  pantoprazole    Tablet 40 milliGRAM(s) Oral daily  senna 2 Tablet(s) Oral at bedtime  tamsulosin 0.8 milliGRAM(s) Oral at bedtime    MEDICATIONS  (PRN):  aluminum hydroxide/magnesium hydroxide/simethicone Suspension 30 milliLiter(s) Oral four times a day PRN Indigestion  bisacodyl Suppository 10 milliGRAM(s) Rectal daily PRN If no bowel movement by postoperative day #2  HYDROmorphone  Injectable 0.5 milliGRAM(s) IV Push every 3 hours PRN Severe Pain (7 - 10)  magnesium hydroxide Suspension 30 milliLiter(s) Oral daily PRN Constipation  ondansetron Injectable 4 milliGRAM(s) IV Push every 6 hours PRN Nausea and/or Vomiting  oxyCODONE    IR 5 milliGRAM(s) Oral every 3 hours PRN Mild Pain (1 - 3)  oxyCODONE    IR 10 milliGRAM(s) Oral every 3 hours PRN Moderate Pain (4 - 6)  polyethylene glycol 3350 17 Gram(s) Oral daily PRN Constipation      LABS:                        9.0    7.70  )-----------( 124      ( 22 Aug 2018 07:15 )             26.5     08-22    138  |  106  |  24<H>  ----------------------------<  99  4.3   |  29  |  1.39<H>    Ca    8.8      22 Aug 2018 07:15            CAPILLARY BLOOD GLUCOSE      POCT Blood Glucose.: 102 mg/dL (22 Aug 2018 08:00)      CAPILLARY BLOOD GLUCOSE      POCT Blood Glucose.: 102 mg/dL (22 Aug 2018 08:00)    CAPILLARY BLOOD GLUCOSE      POCT Blood Glucose.: 102 mg/dL (22 Aug 2018 08:00)            RECENT CULTURES:      RADIOLOGY & ADDITIONAL TESTS:                        DVT/GI ppx  Discussed with pt @ bedside

## 2018-08-22 NOTE — PROGRESS NOTE ADULT - ASSESSMENT
S/p TKR for severe left knee osteoarthritis, resolved CHARLIE, CKD at baseline. Urinary retention. To be discharged to [facility] with Caceres catheter for further voiding trial there and urology f/u.  Stable from nephrologic viewpoint for discharge.

## 2018-08-22 NOTE — PROGRESS NOTE ADULT - ASSESSMENT
DISCUSSION:    Patient is a 86y old  Male who presents with a chief complaint of Left TKR for severe left knee osteoarthritis (20 Aug 2018 18:09)  HPI: 86 year old male with hx of hypertension CAD PCI  more than 7 years ago , PAF HLD  who underwent left knee replacement  who developed post op blood loss anemia , patient received  multiple units of PRBC , dropped his hemoglobin after recieving eliquis dose ,         # Problem: Paroxysmal atrial fibrillation. hx of PAF , currently in sinus rhythm , continue BB ,   monitor hemoglobin , continue Eliquis if hemoglobin is stable ,     Hb stable around 9 gm       # Problem: CAD (coronary artery disease).     asymptomatic stable , continue BB , ecotrin, statin.       #  Problem: Essential hypertension.  controlled current therapy      #  Problem: Anemia due to blood loss.  Recommendation: check guiac ,   monitor hemoglobin   continue protonix.   ? GI CONSULT DISCUSSION:    Patient is a 86y old  Male who presents with a chief complaint of Left TKR for severe left knee osteoarthritis (20 Aug 2018 18:09)  HPI: 86 year old male with hx of hypertension CAD PCI  more than 7 years ago , PAF HLD  who underwent left knee replacement  who developed post op blood loss anemia , patient received  multiple units of PRBC , dropped his hemoglobin after recieving eliquis dose ,         # Problem: Paroxysmal atrial fibrillation. hx of PAF , currently in sinus rhythm , continue BB ,   monitor hemoglobin , continue Eliquis if hemoglobin is stable ,     Hb stable around 9 gm       # Problem: CAD (coronary artery disease).     asymptomatic stable , continue BB , ecotrin, statin.         #  Problem: Anemia due to blood loss.  Recommendation: check guiac ,   monitor hemoglobin   continue protonix.   ? GI CONSULT if continued drop in HCT  MOST LIKELY ANEMIA RELATED TO TKR    DISCUSSED WITH PATIENT

## 2018-08-22 NOTE — PROGRESS NOTE ADULT - PROVIDER SPECIALTY LIST ADULT
Cardiology
Hospitalist
Nephrology
Orthopedics
Pharmacy
Urology
Hospitalist

## 2018-08-23 ENCOUNTER — OUTPATIENT (OUTPATIENT)
Dept: OUTPATIENT SERVICES | Facility: HOSPITAL | Age: 83
LOS: 1 days | Discharge: HOME | End: 2018-08-23
Payer: COMMERCIAL

## 2018-08-23 DIAGNOSIS — Z98.41 CATARACT EXTRACTION STATUS, RIGHT EYE: Chronic | ICD-10-CM

## 2018-08-23 DIAGNOSIS — Z98.890 OTHER SPECIFIED POSTPROCEDURAL STATES: Chronic | ICD-10-CM

## 2018-08-23 DIAGNOSIS — I82.403 ACUTE EMBOLISM AND THROMBOSIS OF UNSPECIFIED DEEP VEINS OF LOWER EXTREMITY, BILATERAL: ICD-10-CM

## 2018-08-23 DIAGNOSIS — Z95.5 PRESENCE OF CORONARY ANGIOPLASTY IMPLANT AND GRAFT: Chronic | ICD-10-CM

## 2018-08-23 DIAGNOSIS — Z98.42 CATARACT EXTRACTION STATUS, LEFT EYE: Chronic | ICD-10-CM

## 2018-08-23 PROCEDURE — 93970 EXTREMITY STUDY: CPT | Mod: 26

## 2018-09-05 ENCOUNTER — APPOINTMENT (OUTPATIENT)
Dept: ORTHOPEDIC SURGERY | Facility: CLINIC | Age: 83
End: 2018-09-05
Payer: COMMERCIAL

## 2018-09-05 VITALS
SYSTOLIC BLOOD PRESSURE: 133 MMHG | TEMPERATURE: 98.4 F | HEART RATE: 66 BPM | WEIGHT: 205 LBS | BODY MASS INDEX: 27.77 KG/M2 | HEIGHT: 72 IN | DIASTOLIC BLOOD PRESSURE: 69 MMHG

## 2018-09-05 PROCEDURE — 73562 X-RAY EXAM OF KNEE 3: CPT | Mod: LT

## 2018-09-05 PROCEDURE — 99024 POSTOP FOLLOW-UP VISIT: CPT

## 2018-10-03 ENCOUNTER — APPOINTMENT (OUTPATIENT)
Dept: ORTHOPEDIC SURGERY | Facility: CLINIC | Age: 83
End: 2018-10-03
Payer: COMMERCIAL

## 2018-10-03 VITALS
WEIGHT: 204 LBS | SYSTOLIC BLOOD PRESSURE: 143 MMHG | HEART RATE: 65 BPM | BODY MASS INDEX: 27.63 KG/M2 | DIASTOLIC BLOOD PRESSURE: 70 MMHG | HEIGHT: 72 IN

## 2018-10-03 PROCEDURE — 73562 X-RAY EXAM OF KNEE 3: CPT | Mod: LT

## 2018-10-03 PROCEDURE — 99024 POSTOP FOLLOW-UP VISIT: CPT

## 2018-11-14 ENCOUNTER — APPOINTMENT (OUTPATIENT)
Dept: ORTHOPEDIC SURGERY | Facility: CLINIC | Age: 83
End: 2018-11-14

## 2019-01-08 ENCOUNTER — APPOINTMENT (OUTPATIENT)
Dept: ORTHOPEDIC SURGERY | Facility: CLINIC | Age: 84
End: 2019-01-08
Payer: COMMERCIAL

## 2019-01-08 VITALS
HEIGHT: 72 IN | WEIGHT: 204 LBS | SYSTOLIC BLOOD PRESSURE: 170 MMHG | BODY MASS INDEX: 27.63 KG/M2 | DIASTOLIC BLOOD PRESSURE: 54 MMHG | HEART RATE: 56 BPM

## 2019-01-08 DIAGNOSIS — Z96.652 PRESENCE OF LEFT ARTIFICIAL KNEE JOINT: ICD-10-CM

## 2019-01-08 PROCEDURE — 73562 X-RAY EXAM OF KNEE 3: CPT | Mod: LT

## 2019-01-08 PROCEDURE — 99213 OFFICE O/P EST LOW 20 MIN: CPT

## 2019-01-08 NOTE — PHYSICAL EXAM
[de-identified] : Exam of the left knee reveals the incision is well-healed. There is no warmth or erythema. There is no effusion. He lacks about 3° of extension. He flexes to approximately 85°. No instability is noted. Extensor mechanism is intact with good strength. Patellofemoral tracking is excellent.Pulses are intact distally.  Capillary refill is normal. There is no edema or lymphadenopathy. [de-identified] : AP, lateral, and merchant views of the left knee were obtained.  The patient is status post total knee replacement.  The components are well fixed with good alignment. No evidence of loosening or periprosthetic fracture is noted.

## 2019-01-08 NOTE — DISCUSSION/SUMMARY
[de-identified] : The patient has some stiffness status post left total knee replacement. Due to the stiffness I recommended continue aggressive therapy. I gave him some instruction on home stretching exercises. I'll see him back in 6 weeks for reevaluation

## 2019-01-08 NOTE — REVIEW OF SYSTEMS
[Negative] : Heme/Lymph [Fever] : no fever [Chills] : no chills [SOB at rest] : no shortness of breath at rest

## 2019-01-08 NOTE — REASON FOR VISIT
[Follow-Up Visit] : a follow-up visit for [FreeTextEntry2] : S/P left total knee replacement  DOS 8/17/18

## 2019-02-25 ENCOUNTER — APPOINTMENT (OUTPATIENT)
Dept: ORTHOPEDIC SURGERY | Facility: CLINIC | Age: 84
End: 2019-02-25

## 2019-08-12 ENCOUNTER — MEDICATION RENEWAL (OUTPATIENT)
Age: 84
End: 2019-08-12

## 2019-10-21 ENCOUNTER — MEDICATION RENEWAL (OUTPATIENT)
Age: 84
End: 2019-10-21

## 2020-01-02 ENCOUNTER — MEDICATION RENEWAL (OUTPATIENT)
Age: 85
End: 2020-01-02

## 2020-01-06 ENCOUNTER — RX RENEWAL (OUTPATIENT)
Age: 85
End: 2020-01-06

## 2020-01-23 ENCOUNTER — LABORATORY RESULT (OUTPATIENT)
Age: 85
End: 2020-01-23

## 2020-01-23 ENCOUNTER — APPOINTMENT (OUTPATIENT)
Dept: CARDIOLOGY | Facility: CLINIC | Age: 85
End: 2020-01-23
Payer: COMMERCIAL

## 2020-01-23 VITALS
WEIGHT: 199 LBS | DIASTOLIC BLOOD PRESSURE: 62 MMHG | OXYGEN SATURATION: 97 % | BODY MASS INDEX: 26.99 KG/M2 | TEMPERATURE: 98.6 F | SYSTOLIC BLOOD PRESSURE: 164 MMHG | HEART RATE: 69 BPM | RESPIRATION RATE: 17 BRPM

## 2020-01-23 DIAGNOSIS — R42 DIZZINESS AND GIDDINESS: ICD-10-CM

## 2020-01-23 DIAGNOSIS — R26.81 UNSTEADINESS ON FEET: ICD-10-CM

## 2020-01-23 PROCEDURE — 99214 OFFICE O/P EST MOD 30 MIN: CPT

## 2020-01-23 PROCEDURE — 93000 ELECTROCARDIOGRAM COMPLETE: CPT

## 2020-01-23 NOTE — PHYSICAL EXAM
[General Appearance - Well Developed] : well developed [Normal Appearance] : normal appearance [Well Groomed] : well groomed [General Appearance - Well Nourished] : well nourished [No Deformities] : no deformities [Normal Conjunctiva] : the conjunctiva exhibited no abnormalities [General Appearance - In No Acute Distress] : no acute distress [Normal Oral Mucosa] : normal oral mucosa [Eyelids - No Xanthelasma] : the eyelids demonstrated no xanthelasmas [No Oral Pallor] : no oral pallor [No Oral Cyanosis] : no oral cyanosis [Normal Jugular Venous A Waves Present] : normal jugular venous A waves present [Normal Jugular Venous V Waves Present] : normal jugular venous V waves present [No Jugular Venous Burnette A Waves] : no jugular venous burnette A waves [Respiration, Rhythm And Depth] : normal respiratory rhythm and effort [Auscultation Breath Sounds / Voice Sounds] : lungs were clear to auscultation bilaterally [Exaggerated Use Of Accessory Muscles For Inspiration] : no accessory muscle use [Abdomen Tenderness] : non-tender [Abdomen Soft] : soft [Abdomen Mass (___ Cm)] : no abdominal mass palpated [Abnormal Walk] : normal gait [Gait - Sufficient For Exercise Testing] : the gait was sufficient for exercise testing [Nail Clubbing] : no clubbing of the fingernails [Petechial Hemorrhages (___cm)] : no petechial hemorrhages [Cyanosis, Localized] : no localized cyanosis [Skin Color & Pigmentation] : normal skin color and pigmentation [Skin Lesions] : no skin lesions [] : no rash [No Venous Stasis] : no venous stasis [No Skin Ulcers] : no skin ulcer [Affect] : the affect was normal [No Xanthoma] : no  xanthoma was observed [Oriented To Time, Place, And Person] : oriented to person, place, and time [Mood] : the mood was normal [No Anxiety] : not feeling anxious

## 2020-01-27 ENCOUNTER — APPOINTMENT (OUTPATIENT)
Dept: CARDIOLOGY | Facility: CLINIC | Age: 85
End: 2020-01-27

## 2020-01-27 DIAGNOSIS — R79.89 OTHER SPECIFIED ABNORMAL FINDINGS OF BLOOD CHEMISTRY: ICD-10-CM

## 2020-01-27 LAB
ALBUMIN SERPL ELPH-MCNC: 4.3 G/DL
ALP BLD-CCNC: 71 U/L
ALT SERPL-CCNC: 13 U/L
ANION GAP SERPL CALC-SCNC: 13 MMOL/L
APPEARANCE: CLEAR
AST SERPL-CCNC: 21 U/L
BACTERIA: NEGATIVE
BASOPHILS # BLD AUTO: 0.05 K/UL
BASOPHILS NFR BLD AUTO: 0.8 %
BILIRUB SERPL-MCNC: 0.6 MG/DL
BILIRUBIN URINE: NEGATIVE
BLOOD URINE: NEGATIVE
BUN SERPL-MCNC: 31 MG/DL
CALCIUM SERPL-MCNC: 9.4 MG/DL
CHLORIDE SERPL-SCNC: 104 MMOL/L
CHOLEST SERPL-MCNC: 187 MG/DL
CHOLEST/HDLC SERPL: 3.8 RATIO
CK SERPL-CCNC: 109 U/L
CO2 SERPL-SCNC: 24 MMOL/L
COLOR: NORMAL
CREAT SERPL-MCNC: 1.51 MG/DL
EOSINOPHIL # BLD AUTO: 0.18 K/UL
EOSINOPHIL NFR BLD AUTO: 2.8 %
ESTIMATED AVERAGE GLUCOSE: 117 MG/DL
GLUCOSE QUALITATIVE U: NEGATIVE
GLUCOSE SERPL-MCNC: 87 MG/DL
HBA1C MFR BLD HPLC: 5.7 %
HCT VFR BLD CALC: 34.5 %
HDLC SERPL-MCNC: 49 MG/DL
HGB BLD-MCNC: 11.4 G/DL
HYALINE CASTS: 0 /LPF
IMM GRANULOCYTES NFR BLD AUTO: 0.3 %
KETONES URINE: NEGATIVE
LDLC SERPL CALC-MCNC: 121 MG/DL
LDLC SERPL DIRECT ASSAY-MCNC: 120 MG/DL
LEUKOCYTE ESTERASE URINE: NEGATIVE
LYMPHOCYTES # BLD AUTO: 1.52 K/UL
LYMPHOCYTES NFR BLD AUTO: 24 %
MAGNESIUM SERPL-MCNC: 2.1 MG/DL
MAN DIFF?: NORMAL
MCHC RBC-ENTMCNC: 32.4 PG
MCHC RBC-ENTMCNC: 33 GM/DL
MCV RBC AUTO: 98 FL
MICROSCOPIC-UA: NORMAL
MONOCYTES # BLD AUTO: 0.7 K/UL
MONOCYTES NFR BLD AUTO: 11.1 %
NEUTROPHILS # BLD AUTO: 3.86 K/UL
NEUTROPHILS NFR BLD AUTO: 61 %
NITRITE URINE: NEGATIVE
NT-PROBNP SERPL-MCNC: 766 PG/ML
PH URINE: 6
PHOSPHATE SERPL-MCNC: 3.2 MG/DL
PLATELET # BLD AUTO: 200 K/UL
POTASSIUM SERPL-SCNC: 4.4 MMOL/L
PROT SERPL-MCNC: 7.1 G/DL
PROTEIN URINE: ABNORMAL
PSA SERPL-MCNC: 13.2 NG/ML
RBC # BLD: 3.52 M/UL
RBC # FLD: 12.9 %
RED BLOOD CELLS URINE: 1 /HPF
SODIUM SERPL-SCNC: 141 MMOL/L
SPECIFIC GRAVITY URINE: 1.01
SQUAMOUS EPITHELIAL CELLS: 2 /HPF
T3RU NFR SERPL: 1 TBI
T4 FREE SERPL-MCNC: 1.2 NG/DL
T4 SERPL-MCNC: 5.5 UG/DL
TRIGL SERPL-MCNC: 88 MG/DL
TSH SERPL-ACNC: 5.17 UIU/ML
URATE SERPL-MCNC: 5.9 MG/DL
UROBILINOGEN URINE: NORMAL
WBC # FLD AUTO: 6.33 K/UL
WHITE BLOOD CELLS URINE: 1 /HPF

## 2020-01-27 NOTE — HISTORY OF PRESENT ILLNESS
[FreeTextEntry1] : The patient presents for evaluation of high blood pressure. Patient is currently tolerating the current  antihypertensive regime and they deny headaches, stiff neck, visual changes, pedal Edema or PND. They also are here for follow-up of elevated cholesterol. Patient is currently tolerating medication and and does not complain of new  muscle pain, joint pain, back pain,urinary changes ,nausea, vomiting, abdominal pain or diarrhea. The patient is trying to follow a low cholesterol diet. \par Pt states that he notices that his balance has been off for the past few months, he states that when he stands up he sometimes feels nauseous and when he walks he feels as though he loses his balance. Pt states that he has fallen in the past when he tries to move too fast\par Pt is on Eliquis, has had two strokes in the past with no residual\par Pt was hospitalized seven or eight months ago (pt does not remember) for pneumonia

## 2020-02-12 ENCOUNTER — APPOINTMENT (OUTPATIENT)
Dept: CARDIOLOGY | Facility: CLINIC | Age: 85
End: 2020-02-12
Payer: COMMERCIAL

## 2020-02-12 ENCOUNTER — NON-APPOINTMENT (OUTPATIENT)
Age: 85
End: 2020-02-12

## 2020-02-12 VITALS
HEART RATE: 44 BPM | BODY MASS INDEX: 26.95 KG/M2 | TEMPERATURE: 97.6 F | WEIGHT: 199 LBS | OXYGEN SATURATION: 94 % | SYSTOLIC BLOOD PRESSURE: 170 MMHG | HEIGHT: 72 IN | DIASTOLIC BLOOD PRESSURE: 65 MMHG

## 2020-02-12 PROCEDURE — 93306 TTE W/DOPPLER COMPLETE: CPT

## 2020-02-12 PROCEDURE — 93000 ELECTROCARDIOGRAM COMPLETE: CPT

## 2020-02-12 PROCEDURE — 99213 OFFICE O/P EST LOW 20 MIN: CPT

## 2020-02-15 NOTE — PHYSICAL EXAM
[General Appearance - Well Developed] : well developed [Well Groomed] : well groomed [Normal Appearance] : normal appearance [Normal Conjunctiva] : the conjunctiva exhibited no abnormalities [No Deformities] : no deformities [General Appearance - Well Nourished] : well nourished [General Appearance - In No Acute Distress] : no acute distress [Normal Oral Mucosa] : normal oral mucosa [Eyelids - No Xanthelasma] : the eyelids demonstrated no xanthelasmas [No Oral Pallor] : no oral pallor [Normal Jugular Venous A Waves Present] : normal jugular venous A waves present [No Oral Cyanosis] : no oral cyanosis [Normal Jugular Venous V Waves Present] : normal jugular venous V waves present [No Jugular Venous Burnette A Waves] : no jugular venous burnette A waves [Auscultation Breath Sounds / Voice Sounds] : lungs were clear to auscultation bilaterally [Respiration, Rhythm And Depth] : normal respiratory rhythm and effort [Exaggerated Use Of Accessory Muscles For Inspiration] : no accessory muscle use [Abdomen Soft] : soft [Abdomen Tenderness] : non-tender [Abnormal Walk] : normal gait [Abdomen Mass (___ Cm)] : no abdominal mass palpated [Gait - Sufficient For Exercise Testing] : the gait was sufficient for exercise testing [Cyanosis, Localized] : no localized cyanosis [Nail Clubbing] : no clubbing of the fingernails [Petechial Hemorrhages (___cm)] : no petechial hemorrhages [Skin Color & Pigmentation] : normal skin color and pigmentation [] : no rash [Skin Lesions] : no skin lesions [No Venous Stasis] : no venous stasis [No Skin Ulcers] : no skin ulcer [No Xanthoma] : no  xanthoma was observed [Oriented To Time, Place, And Person] : oriented to person, place, and time [Affect] : the affect was normal [Mood] : the mood was normal [No Anxiety] : not feeling anxious [FreeTextEntry1] : Regular rate and rhythm, NL S1, S2, non-displaced PMI, chest non-tender; no rubs,heaves  or gallops a  Grade 2/6 systolic murmur noted at the LSB

## 2020-02-15 NOTE — HISTORY OF PRESENT ILLNESS
[FreeTextEntry1] : The patient presents for evaluation of high blood pressure. Patient is currently tolerating the current  antihypertensive regime and they deny headaches, stiff neck, visual changes, pedal Edema or PND. They also are here for follow-up of elevated cholesterol. Patient is currently tolerating medication and and does not complain of new  muscle pain, joint pain, back pain,urinary changes ,nausea, vomiting, abdominal pain or diarrhea. The patient is trying to follow a low cholesterol diet. 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

## 2021-01-13 ENCOUNTER — LABORATORY RESULT (OUTPATIENT)
Age: 86
End: 2021-01-13

## 2021-01-13 ENCOUNTER — NON-APPOINTMENT (OUTPATIENT)
Age: 86
End: 2021-01-13

## 2021-01-13 ENCOUNTER — APPOINTMENT (OUTPATIENT)
Dept: CARDIOLOGY | Facility: CLINIC | Age: 86
End: 2021-01-13
Payer: COMMERCIAL

## 2021-01-13 VITALS
HEART RATE: 56 BPM | DIASTOLIC BLOOD PRESSURE: 62 MMHG | SYSTOLIC BLOOD PRESSURE: 140 MMHG | TEMPERATURE: 98.5 F | OXYGEN SATURATION: 98 %

## 2021-01-13 PROCEDURE — 99072 ADDL SUPL MATRL&STAF TM PHE: CPT

## 2021-01-13 PROCEDURE — 99214 OFFICE O/P EST MOD 30 MIN: CPT

## 2021-01-13 PROCEDURE — 93000 ELECTROCARDIOGRAM COMPLETE: CPT

## 2021-01-13 RX ORDER — LOSARTAN POTASSIUM 25 MG/1
25 TABLET, FILM COATED ORAL DAILY
Qty: 30 | Refills: 0 | Status: DISCONTINUED | COMMUNITY
Start: 2021-01-13 | End: 2021-01-13

## 2021-01-13 RX ORDER — APIXABAN 2.5 MG/1
2.5 TABLET, FILM COATED ORAL
Qty: 60 | Refills: 0 | Status: DISCONTINUED | COMMUNITY
Start: 2019-08-12 | End: 2021-01-13

## 2021-01-13 RX ORDER — OLMESARTAN MEDOXOMIL AND HYDROCHLOROTHIAZIDE 40; 25 MG/1; MG/1
40-25 TABLET ORAL DAILY
Qty: 30 | Refills: 0 | Status: DISCONTINUED | COMMUNITY
Start: 2019-06-14 | End: 2021-01-13

## 2021-01-13 RX ORDER — FUROSEMIDE 20 MG/1
20 TABLET ORAL DAILY
Qty: 15 | Refills: 0 | Status: DISCONTINUED | COMMUNITY
Start: 2020-01-27 | End: 2021-01-13

## 2021-01-13 NOTE — PHYSICAL EXAM
[Normal Appearance] : normal appearance [General Appearance - Well Developed] : well developed [Well Groomed] : well groomed [General Appearance - Well Nourished] : well nourished [No Deformities] : no deformities [General Appearance - In No Acute Distress] : no acute distress [Normal Conjunctiva] : the conjunctiva exhibited no abnormalities [Eyelids - No Xanthelasma] : the eyelids demonstrated no xanthelasmas [Normal Oral Mucosa] : normal oral mucosa [No Oral Pallor] : no oral pallor [No Oral Cyanosis] : no oral cyanosis [Normal Jugular Venous A Waves Present] : normal jugular venous A waves present [Normal Jugular Venous V Waves Present] : normal jugular venous V waves present [No Jugular Venous Burnette A Waves] : no jugular venous burnette A waves [Respiration, Rhythm And Depth] : normal respiratory rhythm and effort [Exaggerated Use Of Accessory Muscles For Inspiration] : no accessory muscle use [Auscultation Breath Sounds / Voice Sounds] : lungs were clear to auscultation bilaterally [Heart Rate And Rhythm] : heart rate and rhythm were normal [Heart Sounds] : normal S1 and S2 [Murmurs] : no murmurs present [Abdomen Soft] : soft [Abdomen Tenderness] : non-tender [Abdomen Mass (___ Cm)] : no abdominal mass palpated [Abnormal Walk] : normal gait [Gait - Sufficient For Exercise Testing] : the gait was sufficient for exercise testing [Nail Clubbing] : no clubbing of the fingernails [Cyanosis, Localized] : no localized cyanosis [Petechial Hemorrhages (___cm)] : no petechial hemorrhages [Skin Color & Pigmentation] : normal skin color and pigmentation [] : no rash [No Venous Stasis] : no venous stasis [Skin Lesions] : no skin lesions [No Skin Ulcers] : no skin ulcer [No Xanthoma] : no  xanthoma was observed [Oriented To Time, Place, And Person] : oriented to person, place, and time [Affect] : the affect was normal [Mood] : the mood was normal [No Anxiety] : not feeling anxious

## 2021-01-15 LAB
ALBUMIN SERPL ELPH-MCNC: 4.2 G/DL
ALP BLD-CCNC: 83 U/L
ALT SERPL-CCNC: 13 U/L
ANION GAP SERPL CALC-SCNC: 11 MMOL/L
APPEARANCE: CLEAR
AST SERPL-CCNC: 21 U/L
BACTERIA: NEGATIVE
BASOPHILS # BLD AUTO: 0.05 K/UL
BASOPHILS NFR BLD AUTO: 0.7 %
BILIRUB DIRECT SERPL-MCNC: 0.1 MG/DL
BILIRUB INDIRECT SERPL-MCNC: 0.3 MG/DL
BILIRUB SERPL-MCNC: 0.5 MG/DL
BILIRUBIN URINE: NEGATIVE
BLOOD URINE: NEGATIVE
BUN SERPL-MCNC: 34 MG/DL
CALCIUM SERPL-MCNC: 9.7 MG/DL
CHLORIDE SERPL-SCNC: 101 MMOL/L
CHOLEST SERPL-MCNC: 135 MG/DL
CO2 SERPL-SCNC: 27 MMOL/L
COLOR: YELLOW
CREAT SERPL-MCNC: 1.54 MG/DL
EOSINOPHIL # BLD AUTO: 0.16 K/UL
EOSINOPHIL NFR BLD AUTO: 2.2 %
ESTIMATED AVERAGE GLUCOSE: 111 MG/DL
GLUCOSE QUALITATIVE U: NEGATIVE
GLUCOSE SERPL-MCNC: 87 MG/DL
HBA1C MFR BLD HPLC: 5.5 %
HCT VFR BLD CALC: 38 %
HDLC SERPL-MCNC: 44 MG/DL
HGB BLD-MCNC: 12.2 G/DL
HYALINE CASTS: 1 /LPF
IMM GRANULOCYTES NFR BLD AUTO: 0.1 %
KETONES URINE: NEGATIVE
LDLC SERPL CALC-MCNC: 74 MG/DL
LEUKOCYTE ESTERASE URINE: NEGATIVE
LYMPHOCYTES # BLD AUTO: 2.08 K/UL
LYMPHOCYTES NFR BLD AUTO: 28.3 %
MAGNESIUM SERPL-MCNC: 2.1 MG/DL
MAN DIFF?: NORMAL
MCHC RBC-ENTMCNC: 31.8 PG
MCHC RBC-ENTMCNC: 32.1 GM/DL
MCV RBC AUTO: 99 FL
MICROSCOPIC-UA: NORMAL
MONOCYTES # BLD AUTO: 0.68 K/UL
MONOCYTES NFR BLD AUTO: 9.2 %
NEUTROPHILS # BLD AUTO: 4.38 K/UL
NEUTROPHILS NFR BLD AUTO: 59.5 %
NITRITE URINE: NEGATIVE
NONHDLC SERPL-MCNC: 91 MG/DL
NT-PROBNP SERPL-MCNC: 365 PG/ML
OSMOLALITY SERPL: 299 MOSMOL/KG
PH URINE: 6
PHOSPHATE SERPL-MCNC: 3.3 MG/DL
PLATELET # BLD AUTO: 171 K/UL
POTASSIUM SERPL-SCNC: 4 MMOL/L
PROT SERPL-MCNC: 7.2 G/DL
PROTEIN URINE: ABNORMAL
RBC # BLD: 3.84 M/UL
RBC # FLD: 13.2 %
RED BLOOD CELLS URINE: 1 /HPF
SARS-COV-2 IGG SERPL IA-ACNC: 0.07 INDEX
SARS-COV-2 IGG SERPL QL IA: NEGATIVE
SODIUM SERPL-SCNC: 139 MMOL/L
SPECIFIC GRAVITY URINE: 1.02
SQUAMOUS EPITHELIAL CELLS: 4 /HPF
T3RU NFR SERPL: 1 TBI
T4 FREE SERPL-MCNC: 1.2 NG/DL
T4 SERPL-MCNC: 5.6 UG/DL
TRIGL SERPL-MCNC: 84 MG/DL
TSH SERPL-ACNC: 4.57 UIU/ML
URATE SERPL-MCNC: 5.8 MG/DL
UROBILINOGEN URINE: NORMAL
WBC # FLD AUTO: 7.36 K/UL
WHITE BLOOD CELLS URINE: 1 /HPF

## 2021-01-17 NOTE — HISTORY OF PRESENT ILLNESS
[FreeTextEntry1] : The patient presents for evaluation of high blood pressure. Patient is currently tolerating the current antihypertensive regime and they deny headaches, stiff neck, visual changes, pedal Edema or PND. Pt states his BP has been elevated up to 179/74.They also are here for follow-up of elevated cholesterol. Patient is currently tolerating medication and and does not complain of new muscle pain, joint pain, back pain,urinary changes ,nausea, vomiting, abdominal pain or diarrhea. The patient is trying to follow a low cholesterol diet. \par The patient presents for routine follow up of their coronary artery disease.   The patient has been following all secondary prevents measures and antiplatelet therapy. The patient denies shortness of breath, chest pain, dizziness, palpitations.\par

## 2021-02-23 ENCOUNTER — APPOINTMENT (OUTPATIENT)
Dept: CARDIOLOGY | Facility: CLINIC | Age: 86
End: 2021-02-23
Payer: COMMERCIAL

## 2021-02-23 VITALS
BODY MASS INDEX: 26.95 KG/M2 | TEMPERATURE: 97.6 F | DIASTOLIC BLOOD PRESSURE: 75 MMHG | HEART RATE: 44 BPM | SYSTOLIC BLOOD PRESSURE: 125 MMHG | HEIGHT: 72 IN | OXYGEN SATURATION: 97 % | WEIGHT: 199 LBS

## 2021-02-23 DIAGNOSIS — R80.9 PROTEINURIA, UNSPECIFIED: ICD-10-CM

## 2021-02-23 PROCEDURE — 99213 OFFICE O/P EST LOW 20 MIN: CPT

## 2021-02-23 PROCEDURE — 99072 ADDL SUPL MATRL&STAF TM PHE: CPT

## 2021-02-23 NOTE — HISTORY OF PRESENT ILLNESS
[FreeTextEntry1] : The patient here for evaluation of high blood pressure. Patient is currently tolerating the current antihypertensive regime and they deny headaches, stiff neck, visual changes, or PND. The patient has been trying to stay on a low-sodium diet. Pt was started on losartan hctz at last visit and states his BP have improved. \par

## 2021-02-23 NOTE — PHYSICAL EXAM
[General Appearance - Well Developed] : well developed [Normal Appearance] : normal appearance [Well Groomed] : well groomed [General Appearance - Well Nourished] : well nourished [No Deformities] : no deformities [General Appearance - In No Acute Distress] : no acute distress [Normal Conjunctiva] : the conjunctiva exhibited no abnormalities [Eyelids - No Xanthelasma] : the eyelids demonstrated no xanthelasmas [Normal Oral Mucosa] : normal oral mucosa [No Oral Pallor] : no oral pallor [No Oral Cyanosis] : no oral cyanosis [Normal Jugular Venous A Waves Present] : normal jugular venous A waves present [Normal Jugular Venous V Waves Present] : normal jugular venous V waves present [No Jugular Venous Burnette A Waves] : no jugular venous burnette A waves [Respiration, Rhythm And Depth] : normal respiratory rhythm and effort [Exaggerated Use Of Accessory Muscles For Inspiration] : no accessory muscle use [Auscultation Breath Sounds / Voice Sounds] : lungs were clear to auscultation bilaterally [Heart Rate And Rhythm] : heart rate and rhythm were normal [Heart Sounds] : normal S1 and S2 [Murmurs] : no murmurs present [Abdomen Soft] : soft [Abdomen Tenderness] : non-tender [Abdomen Mass (___ Cm)] : no abdominal mass palpated [Abnormal Walk] : normal gait [Gait - Sufficient For Exercise Testing] : the gait was sufficient for exercise testing [Nail Clubbing] : no clubbing of the fingernails [Cyanosis, Localized] : no localized cyanosis [Petechial Hemorrhages (___cm)] : no petechial hemorrhages [Skin Color & Pigmentation] : normal skin color and pigmentation [] : no rash [No Venous Stasis] : no venous stasis [Skin Lesions] : no skin lesions [No Skin Ulcers] : no skin ulcer [No Xanthoma] : no  xanthoma was observed [Oriented To Time, Place, And Person] : oriented to person, place, and time [Affect] : the affect was normal [Mood] : the mood was normal [No Anxiety] : not feeling anxious

## 2021-08-13 NOTE — HISTORY OF PRESENT ILLNESS
Transitional Care Management Telephone Call    Today's Date: 8/13/2021      Discharge Date: 8/6/2021    Discharged From: [facility]    Items for attention: Schedule follow up with Nephrology  Sign up for [facility]  Obtain digital scale    Care Transitions Assessment    Utilization:  Visit Hospital Last 30 Days: Unplanned inpatient admission   Perception of Change in Health Status D/C: Improving  Discharged To: Home with home health  Discharged Instructions: Received discharge instructions;Needs further clarification    Medications:  Prescriptions: Nifedipine XL Torsemide  Med Review Completed: No (Patient unable to teach back meds Daughter not home at time of call)  Med Prescriptions Filed After D/C: Confirms all meds filled;Confirms taking as prescribed  Questions About Med's Prescribed at D/C: What its for;When to take    Follow-up Care:  Appointments: Angel Kidd-PCP Sreya Pallath, MD-Nephrology  Provider Appt Scheduled Prior to D/C: No  Provider Appt Date: 8/12/2021  Provider Appt to be Scheduled by: Daughter  Type of Provider: Angel Kidd-PCP     Appt Scheduled Prior to D/C: No  Specialist Appt Date: TBD  Type of Specialist: Sreya Pallath, MD-Nephrology    Radiology Test Ordered: CT CHEST ABDOMEN PELVIS-RESULTS DISCUSSED WITH PCP    Follow-up Appt Status: Has not had opportunity to call for appt  With Nephrology    Skilled Services: Yes  Skilled Services Options: Nursing    Equipment/DME:   Does Patient Currently Have DME: Yes  DME Type: Cane;Walker;Wheelchair;Shower Chair  Equipment/DME Intervention Needed: Yes - Intervention Needed (Needs digital scale. Received Rx from PCP-Provided link for DME covered by Medicare)  Oxygen In Use: No    Review of Systems:   Care Transition System Evaluation: Daughter and patient provided answers during transition of care call  General Symptoms: Anxiety;Weakness;Unintended weight loss   Fever: is not present   Pain:  [de-identified] : The patient presents today for followup. 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 He feels that the knee is slightly stiff. He did miss quite good effect EDQ prostate surgery he had in November. He continues to complain of stiffness. He has no significant discomfort. He denies swelling clicking locking or buckling. He denies fever or chills. none   Respiratory Symptoms: Shortness of breath  Shortness of Breath: SOB with mild exertionSOB with moderate exercise  Cardiovascular Symptoms: None  Hours of Uninterrupted Sleep: 4  Sleeping Behaviors: Difficulty falling asleep;Middle of night awakening  GI Symptoms: None  Abdominal Tenderness: Denies   Symptoms:  (Denies)  Bowel Ostomy Type: Urinary elimination  Genitourinary Symptoms: Denies  Urinary Elimination: Voiding, no difficulities  Feeding Tube Type: None  Skin Symptoms: None  Wound Type: None    Activities of Daily Living (global): Partial assistance    Patient states that she does have sufficient family support. She feels that she is able to ask for assistance when needed.     Care Coordination: Referral to [facility]    Education/Resources:  https://www.medicare.gov/coverage/durable-medical-equipment-dme-coverage  [email or web address]  [email or web address]    Heart Failure Stop Light  Welcome Letter    Electronically Signed by: Freida Huerta LPN

## 2021-12-28 NOTE — DISCHARGE NOTE ADULT - FUNCTIONAL STATUS DATE
Pt DC'd to home. IV removed, discharge instructions provided to patient, pt verbalizes understanding. Pt states all questions have been answered. Copy of discharge paperwork provided to pt, signed copy in chart. Pt states all belongings in possession. Pt has paper rx. Pt escorted off unit by RN without incident.   20-Aug-2018 22-Aug-2018

## 2022-03-02 NOTE — PHYSICAL THERAPY INITIAL EVALUATION ADULT - STANDING BALANCE: STATIC
Bedside and Verbal shift change report given to Becky Sawyer RN by Shaquille Tse RN.  Report included the following information SBAR, Kardex, OR Summary, Intake/Output and MAR fair balance

## 2022-04-06 ENCOUNTER — NON-APPOINTMENT (OUTPATIENT)
Age: 87
End: 2022-04-06

## 2022-04-06 ENCOUNTER — APPOINTMENT (OUTPATIENT)
Dept: CARDIOLOGY | Facility: CLINIC | Age: 87
End: 2022-04-06
Payer: MEDICARE

## 2022-04-06 VITALS
SYSTOLIC BLOOD PRESSURE: 126 MMHG | HEART RATE: 82 BPM | TEMPERATURE: 97.6 F | BODY MASS INDEX: 26.95 KG/M2 | HEIGHT: 72 IN | DIASTOLIC BLOOD PRESSURE: 70 MMHG | OXYGEN SATURATION: 93 % | WEIGHT: 199 LBS

## 2022-04-06 DIAGNOSIS — R97.20 ELEVATED PROSTATE, SPECIFIC ANTIGEN [PSA]: ICD-10-CM

## 2022-04-06 DIAGNOSIS — R14.1 GAS PAIN: ICD-10-CM

## 2022-04-06 DIAGNOSIS — Z00.00 ENCOUNTER FOR GENERAL ADULT MEDICAL EXAMINATION W/OUT ABNORMAL FINDINGS: ICD-10-CM

## 2022-04-06 DIAGNOSIS — R79.89 OTHER SPECIFIED ABNORMAL FINDINGS OF BLOOD CHEMISTRY: ICD-10-CM

## 2022-04-06 PROCEDURE — 93000 ELECTROCARDIOGRAM COMPLETE: CPT

## 2022-04-06 PROCEDURE — 99214 OFFICE O/P EST MOD 30 MIN: CPT

## 2022-04-06 RX ORDER — HYDRALAZINE HYDROCHLORIDE 25 MG/1
25 TABLET ORAL
Qty: 60 | Refills: 3 | Status: DISCONTINUED | COMMUNITY
Start: 2020-02-12 | End: 2022-04-06

## 2022-04-08 LAB
ALBUMIN SERPL ELPH-MCNC: 4.2 G/DL
ALP BLD-CCNC: 73 U/L
ALT SERPL-CCNC: 14 U/L
ANION GAP SERPL CALC-SCNC: 12 MMOL/L
APO B SERPL-MCNC: 62 MG/DL
APPEARANCE: CLEAR
AST SERPL-CCNC: 27 U/L
BACTERIA: NEGATIVE
BASOPHILS # BLD AUTO: 0.06 K/UL
BASOPHILS NFR BLD AUTO: 0.8 %
BILIRUB DIRECT SERPL-MCNC: 0.2 MG/DL
BILIRUB INDIRECT SERPL-MCNC: 0.4 MG/DL
BILIRUB SERPL-MCNC: 0.5 MG/DL
BILIRUBIN URINE: NEGATIVE
BLOOD URINE: NEGATIVE
BUN SERPL-MCNC: 32 MG/DL
CALCIUM SERPL-MCNC: 9.9 MG/DL
CHLORIDE SERPL-SCNC: 103 MMOL/L
CHOLEST SERPL-MCNC: 130 MG/DL
CK SERPL-CCNC: 120 U/L
CO2 SERPL-SCNC: 25 MMOL/L
COLOR: NORMAL
COVID-19 SPIKE DOMAIN ANTIBODY INTERPRETATION: POSITIVE
CREAT SERPL-MCNC: 1.56 MG/DL
EGFR: 42 ML/MIN/1.73M2
EOSINOPHIL # BLD AUTO: 0.2 K/UL
EOSINOPHIL NFR BLD AUTO: 2.8 %
ESTIMATED AVERAGE GLUCOSE: 120 MG/DL
GLUCOSE QUALITATIVE U: NEGATIVE
GLUCOSE SERPL-MCNC: 79 MG/DL
HBA1C MFR BLD HPLC: 5.8 %
HCT VFR BLD CALC: 38.9 %
HDLC SERPL-MCNC: 42 MG/DL
HGB BLD-MCNC: 12.6 G/DL
HYALINE CASTS: 0 /LPF
IMM GRANULOCYTES NFR BLD AUTO: 0.3 %
IRON SERPL-MCNC: 80 UG/DL
KETONES URINE: NEGATIVE
LDLC SERPL CALC-MCNC: 70 MG/DL
LDLC SERPL DIRECT ASSAY-MCNC: 70 MG/DL
LEUKOCYTE ESTERASE URINE: NEGATIVE
LYMPHOCYTES # BLD AUTO: 2.01 K/UL
LYMPHOCYTES NFR BLD AUTO: 28 %
MAGNESIUM SERPL-MCNC: 2.1 MG/DL
MAN DIFF?: NORMAL
MCHC RBC-ENTMCNC: 32.4 GM/DL
MCHC RBC-ENTMCNC: 32.4 PG
MCV RBC AUTO: 100 FL
MICROSCOPIC-UA: NORMAL
MONOCYTES # BLD AUTO: 0.74 K/UL
MONOCYTES NFR BLD AUTO: 10.3 %
NEUTROPHILS # BLD AUTO: 4.15 K/UL
NEUTROPHILS NFR BLD AUTO: 57.8 %
NITRITE URINE: NEGATIVE
NONHDLC SERPL-MCNC: 88 MG/DL
NT-PROBNP SERPL-MCNC: 1495 PG/ML
PH URINE: 6
PLATELET # BLD AUTO: 165 K/UL
POTASSIUM SERPL-SCNC: 4.5 MMOL/L
PROT SERPL-MCNC: 7.7 G/DL
PROTEIN URINE: NORMAL
PSA SERPL-MCNC: 17.2 NG/ML
RBC # BLD: 3.89 M/UL
RBC # FLD: 13.1 %
RED BLOOD CELLS URINE: 0 /HPF
SARS-COV-2 AB SERPL IA-ACNC: >250 U/ML
SODIUM SERPL-SCNC: 140 MMOL/L
SPECIFIC GRAVITY URINE: 1.01
SQUAMOUS EPITHELIAL CELLS: 0 /HPF
T3FREE SERPL-MCNC: 2.67 PG/ML
T4 FREE SERPL-MCNC: 1 NG/DL
TRIGL SERPL-MCNC: 91 MG/DL
TSH SERPL-ACNC: 6.23 UIU/ML
URATE SERPL-MCNC: 5.9 MG/DL
UROBILINOGEN URINE: NORMAL
WBC # FLD AUTO: 7.18 K/UL
WHITE BLOOD CELLS URINE: 0 /HPF

## 2022-04-14 RX ORDER — APIXABAN 2.5 MG/1
2.5 TABLET, FILM COATED ORAL
Qty: 180 | Refills: 1 | Status: DISCONTINUED | COMMUNITY
Start: 2019-06-14 | End: 2022-04-14

## 2022-04-21 ENCOUNTER — APPOINTMENT (OUTPATIENT)
Dept: CARDIOLOGY | Facility: CLINIC | Age: 87
End: 2022-04-21
Payer: MEDICARE

## 2022-04-21 PROCEDURE — 93306 TTE W/DOPPLER COMPLETE: CPT

## 2022-04-25 NOTE — HISTORY OF PRESENT ILLNESS
[FreeTextEntry1] : The patient presents for evaluation of high blood pressure. Patient is currently tolerating the current  antihypertensive regime and they deny headaches, stiff neck, visual changes, pedal Edema or PND. They also are here for follow-up of elevated cholesterol. Patient is currently tolerating medication and denies muscle pain, joint pain, back pain, tea colored urine ,nausea, vomiting, abdominal pain or diarrhea. The patient is trying to follow a low cholesterol diet.\par The patient is here for follow-up of congestive heart failure.  The patient states that they're taking their medications reliably and have not noticed any serious weight gain since the last visit.\par The patient presents for routine follow up of their coronary artery disease.   The patient has been following all secondary prevents measures and antiplatelet therapy. The patient denies shortness of breath, chest pain, dizziness, palpitations. s/p stent x 1. \par The patient is here for follow-up of hypothyroidism. The patient states that they are taking their medicine regularly that they have no new complaints. They deny any  heat or cold intolerance hair loss and fatigue.\par Pt with CVA 10 years ago. \par

## 2022-10-18 ENCOUNTER — NON-APPOINTMENT (OUTPATIENT)
Age: 87
End: 2022-10-18

## 2022-10-18 ENCOUNTER — APPOINTMENT (OUTPATIENT)
Dept: CARDIOLOGY | Facility: CLINIC | Age: 87
End: 2022-10-18

## 2022-10-18 VITALS
WEIGHT: 199 LBS | HEART RATE: 84 BPM | BODY MASS INDEX: 26.95 KG/M2 | DIASTOLIC BLOOD PRESSURE: 90 MMHG | HEIGHT: 72 IN | SYSTOLIC BLOOD PRESSURE: 140 MMHG | OXYGEN SATURATION: 97 % | TEMPERATURE: 97.7 F

## 2022-10-18 VITALS — DIASTOLIC BLOOD PRESSURE: 80 MMHG | SYSTOLIC BLOOD PRESSURE: 130 MMHG

## 2022-10-18 DIAGNOSIS — Z23 ENCOUNTER FOR IMMUNIZATION: ICD-10-CM

## 2022-10-18 PROCEDURE — 93000 ELECTROCARDIOGRAM COMPLETE: CPT

## 2022-10-18 PROCEDURE — 99214 OFFICE O/P EST MOD 30 MIN: CPT

## 2022-10-18 RX ORDER — FUROSEMIDE 20 MG/1
20 TABLET ORAL DAILY
Qty: 5 | Refills: 0 | Status: DISCONTINUED | COMMUNITY
Start: 2022-04-08 | End: 2022-10-18

## 2022-10-18 RX ORDER — MECLIZINE HYDROCHLORIDE 12.5 MG/1
12.5 TABLET ORAL
Qty: 180 | Refills: 1 | Status: ACTIVE | COMMUNITY
Start: 2020-01-23 | End: 1900-01-01

## 2022-10-19 ENCOUNTER — MED ADMIN CHARGE (OUTPATIENT)
Age: 87
End: 2022-10-19

## 2022-10-20 PROBLEM — Z23 ENCOUNTER FOR IMMUNIZATION: Status: ACTIVE | Noted: 2022-10-20

## 2022-10-20 NOTE — HISTORY OF PRESENT ILLNESS
[FreeTextEntry1] : The patient presents for evaluation of high blood pressure. Patient is currently tolerating the current antihypertensive regime and they deny headaches, stiff neck, visual changes, pedal Edema or PND. They also are here for follow-up of elevated cholesterol. Patient is currently tolerating medication and denies muscle pain, joint pain, back pain, tea colored urine ,nausea, vomiting, abdominal pain or diarrhea. The patient is trying to follow a low cholesterol diet.\par \par The patient is here for follow-up of congestive heart failure. The patient states that they're taking their medications reliably and have not noticed any serious weight gain since the last visit.\par \par The patient presents for routine follow up of their coronary artery disease. The patient has been following all secondary prevents measures and antiplatelet therapy. The patient denies shortness of breath, chest pain, dizziness, palpitations. s/p stent x 1. \par \par The patient is here for follow-up of hypothyroidism. The patient states that they are taking their medicine regularly that they have no new complaints. They deny any heat or cold intolerance hair loss and fatigue.\par \par Pt with CVA 10 years ago.

## 2022-10-21 LAB
ALBUMIN SERPL ELPH-MCNC: 4.3 G/DL
ALP BLD-CCNC: 66 U/L
ALT SERPL-CCNC: 10 U/L
ANION GAP SERPL CALC-SCNC: 14 MMOL/L
AST SERPL-CCNC: 21 U/L
BASOPHILS # BLD AUTO: 0.06 K/UL
BASOPHILS NFR BLD AUTO: 0.8 %
BILIRUB DIRECT SERPL-MCNC: 0.2 MG/DL
BILIRUB INDIRECT SERPL-MCNC: 0.3 MG/DL
BILIRUB SERPL-MCNC: 0.5 MG/DL
BUN SERPL-MCNC: 27 MG/DL
CALCIUM SERPL-MCNC: 10.1 MG/DL
CHLORIDE SERPL-SCNC: 103 MMOL/L
CHOLEST SERPL-MCNC: 167 MG/DL
CK SERPL-CCNC: 106 U/L
CO2 SERPL-SCNC: 26 MMOL/L
COVID-19 NUCLEOCAPSID  GAM ANTIBODY INTERPRETATION: NEGATIVE
COVID-19 SPIKE DOMAIN ANTIBODY INTERPRETATION: POSITIVE
CREAT SERPL-MCNC: 1.78 MG/DL
EGFR: 36 ML/MIN/1.73M2
EOSINOPHIL # BLD AUTO: 0.11 K/UL
EOSINOPHIL NFR BLD AUTO: 1.6 %
ESTIMATED AVERAGE GLUCOSE: 117 MG/DL
GLUCOSE SERPL-MCNC: 85 MG/DL
HBA1C MFR BLD HPLC: 5.7 %
HCT VFR BLD CALC: 36.3 %
HDLC SERPL-MCNC: 46 MG/DL
HGB BLD-MCNC: 11.8 G/DL
IMM GRANULOCYTES NFR BLD AUTO: 0.3 %
LDLC SERPL CALC-MCNC: 103 MG/DL
LDLC SERPL DIRECT ASSAY-MCNC: 103 MG/DL
LYMPHOCYTES # BLD AUTO: 1.88 K/UL
LYMPHOCYTES NFR BLD AUTO: 26.6 %
MAN DIFF?: NORMAL
MCHC RBC-ENTMCNC: 32.5 GM/DL
MCHC RBC-ENTMCNC: 32.6 PG
MCV RBC AUTO: 100.3 FL
MONOCYTES # BLD AUTO: 0.86 K/UL
MONOCYTES NFR BLD AUTO: 12.2 %
NEUTROPHILS # BLD AUTO: 4.14 K/UL
NEUTROPHILS NFR BLD AUTO: 58.5 %
NONHDLC SERPL-MCNC: 120 MG/DL
PLATELET # BLD AUTO: 164 K/UL
POTASSIUM SERPL-SCNC: 4.1 MMOL/L
PROT SERPL-MCNC: 7.5 G/DL
RBC # BLD: 3.62 M/UL
RBC # FLD: 13.7 %
SARS-COV-2 AB SERPL IA-ACNC: >250 U/ML
SARS-COV-2 AB SERPL QL IA: 0.08 INDEX
SODIUM SERPL-SCNC: 143 MMOL/L
T3FREE SERPL-MCNC: 2.33 PG/ML
T4 FREE SERPL-MCNC: 1 NG/DL
TRIGL SERPL-MCNC: 86 MG/DL
TSH SERPL-ACNC: 4.86 UIU/ML
WBC # FLD AUTO: 7.07 K/UL

## 2023-01-18 ENCOUNTER — NON-APPOINTMENT (OUTPATIENT)
Age: 88
End: 2023-01-18

## 2023-01-18 ENCOUNTER — APPOINTMENT (OUTPATIENT)
Dept: CARDIOLOGY | Facility: CLINIC | Age: 88
End: 2023-01-18
Payer: MEDICARE

## 2023-01-18 VITALS
HEIGHT: 72 IN | SYSTOLIC BLOOD PRESSURE: 120 MMHG | WEIGHT: 199 LBS | BODY MASS INDEX: 26.95 KG/M2 | HEART RATE: 75 BPM | DIASTOLIC BLOOD PRESSURE: 60 MMHG | OXYGEN SATURATION: 99 %

## 2023-01-18 DIAGNOSIS — I63.9 CEREBRAL INFARCTION, UNSPECIFIED: ICD-10-CM

## 2023-01-18 PROCEDURE — 93000 ELECTROCARDIOGRAM COMPLETE: CPT

## 2023-01-18 PROCEDURE — 99214 OFFICE O/P EST MOD 30 MIN: CPT

## 2023-01-19 NOTE — HISTORY OF PRESENT ILLNESS
[FreeTextEntry1] : The patient presents for evaluation of high blood pressure. Patient is currently tolerating the current antihypertensive regime and they deny headaches, stiff neck, visual changes, pedal Edema or PND. They also are here for follow-up of elevated cholesterol. Patient is currently tolerating medication and denies muscle pain, joint pain, back pain, tea colored urine ,nausea, vomiting, abdominal pain or diarrhea. The patient is trying to follow a low cholesterol diet.\par \par The patient is here for follow-up of congestive heart failure. The patient states that they're taking their medications reliably and have not noticed any serious weight gain since the last visit.\par \par The patient presents for routine follow up of their coronary artery disease. The patient has been following all secondary prevents measures and antiplatelet therapy. The patient denies shortness of breath, chest pain, dizziness, palpitations. s/p stent x 1. \par \par The patient is here for follow-up of hypothyroidism. The patient states that they are taking their medicine regularly that they have no new complaints. They deny any heat or cold intolerance hair loss and fatigue.\par \par Pt with CVA 10 years ago. \par

## 2023-01-20 LAB
ALBUMIN SERPL ELPH-MCNC: 3.9 G/DL
ALP BLD-CCNC: 67 U/L
ALT SERPL-CCNC: 9 U/L
ANION GAP SERPL CALC-SCNC: 9 MMOL/L
AST SERPL-CCNC: 16 U/L
BASOPHILS # BLD AUTO: 0.03 K/UL
BASOPHILS NFR BLD AUTO: 0.5 %
BILIRUB DIRECT SERPL-MCNC: 0.2 MG/DL
BILIRUB INDIRECT SERPL-MCNC: 0.4 MG/DL
BILIRUB SERPL-MCNC: 0.5 MG/DL
BUN SERPL-MCNC: 26 MG/DL
CALCIUM SERPL-MCNC: 9.7 MG/DL
CHLORIDE SERPL-SCNC: 104 MMOL/L
CHOLEST SERPL-MCNC: 145 MG/DL
CK SERPL-CCNC: 79 U/L
CO2 SERPL-SCNC: 26 MMOL/L
CREAT SERPL-MCNC: 1.57 MG/DL
EGFR: 41 ML/MIN/1.73M2
EOSINOPHIL # BLD AUTO: 0.18 K/UL
EOSINOPHIL NFR BLD AUTO: 3.3 %
ESTIMATED AVERAGE GLUCOSE: 120 MG/DL
GLUCOSE SERPL-MCNC: 82 MG/DL
HBA1C MFR BLD HPLC: 5.8 %
HCT VFR BLD CALC: 32.5 %
HDLC SERPL-MCNC: 44 MG/DL
HGB BLD-MCNC: 10.6 G/DL
IMM GRANULOCYTES NFR BLD AUTO: 0.2 %
LDLC SERPL CALC-MCNC: 84 MG/DL
LDLC SERPL DIRECT ASSAY-MCNC: 86 MG/DL
LYMPHOCYTES # BLD AUTO: 1.3 K/UL
LYMPHOCYTES NFR BLD AUTO: 23.7 %
MAN DIFF?: NORMAL
MCHC RBC-ENTMCNC: 32.6 GM/DL
MCHC RBC-ENTMCNC: 33.3 PG
MCV RBC AUTO: 102.2 FL
MONOCYTES # BLD AUTO: 0.7 K/UL
MONOCYTES NFR BLD AUTO: 12.8 %
NEUTROPHILS # BLD AUTO: 3.27 K/UL
NEUTROPHILS NFR BLD AUTO: 59.5 %
NONHDLC SERPL-MCNC: 100 MG/DL
NT-PROBNP SERPL-MCNC: 1829 PG/ML
OSMOLALITY SERPL: 298 MOSMOL/KG
PLATELET # BLD AUTO: 160 K/UL
POTASSIUM SERPL-SCNC: 4.5 MMOL/L
PROT SERPL-MCNC: 6.3 G/DL
RBC # BLD: 3.18 M/UL
RBC # FLD: 13.9 %
SODIUM SERPL-SCNC: 139 MMOL/L
TRIGL SERPL-MCNC: 81 MG/DL
WBC # FLD AUTO: 5.49 K/UL

## 2023-04-19 ENCOUNTER — APPOINTMENT (OUTPATIENT)
Dept: CARDIOLOGY | Facility: CLINIC | Age: 88
End: 2023-04-19
Payer: COMMERCIAL

## 2023-04-19 ENCOUNTER — NON-APPOINTMENT (OUTPATIENT)
Age: 88
End: 2023-04-19

## 2023-04-19 ENCOUNTER — APPOINTMENT (OUTPATIENT)
Dept: CARDIOLOGY | Facility: CLINIC | Age: 88
End: 2023-04-19
Payer: MEDICARE

## 2023-04-19 VITALS
SYSTOLIC BLOOD PRESSURE: 122 MMHG | DIASTOLIC BLOOD PRESSURE: 62 MMHG | BODY MASS INDEX: 26.95 KG/M2 | HEIGHT: 72 IN | WEIGHT: 199 LBS

## 2023-04-19 DIAGNOSIS — I48.0 PAROXYSMAL ATRIAL FIBRILLATION: ICD-10-CM

## 2023-04-19 DIAGNOSIS — I50.30 UNSPECIFIED DIASTOLIC (CONGESTIVE) HEART FAILURE: ICD-10-CM

## 2023-04-19 PROCEDURE — 93000 ELECTROCARDIOGRAM COMPLETE: CPT

## 2023-04-19 PROCEDURE — 99214 OFFICE O/P EST MOD 30 MIN: CPT

## 2023-04-19 PROCEDURE — 93306 TTE W/DOPPLER COMPLETE: CPT

## 2023-04-19 RX ORDER — ATORVASTATIN CALCIUM 40 MG/1
40 TABLET, FILM COATED ORAL
Qty: 90 | Refills: 1 | Status: ACTIVE | COMMUNITY
Start: 2019-06-14 | End: 1900-01-01

## 2023-04-19 RX ORDER — LEVOTHYROXINE SODIUM 0.03 MG/1
25 TABLET ORAL DAILY
Qty: 90 | Refills: 1 | Status: ACTIVE | COMMUNITY
Start: 2022-04-08 | End: 1900-01-01

## 2023-04-19 RX ORDER — FUROSEMIDE 20 MG/1
20 TABLET ORAL EVERY OTHER DAY
Qty: 15 | Refills: 0 | Status: ACTIVE | COMMUNITY
Start: 2023-01-20 | End: 1900-01-01

## 2023-04-19 RX ORDER — METOPROLOL SUCCINATE 50 MG/1
50 TABLET, EXTENDED RELEASE ORAL
Qty: 180 | Refills: 1 | Status: ACTIVE | COMMUNITY
Start: 2019-08-12 | End: 1900-01-01

## 2023-04-19 RX ORDER — POTASSIUM CHLORIDE 750 MG/1
10 CAPSULE, EXTENDED RELEASE ORAL
Qty: 5 | Refills: 0 | Status: DISCONTINUED | COMMUNITY
Start: 2022-04-08 | End: 2023-04-19

## 2023-04-21 LAB
ALBUMIN SERPL ELPH-MCNC: 4.1 G/DL
ALP BLD-CCNC: 62 U/L
ALT SERPL-CCNC: 11 U/L
ANION GAP SERPL CALC-SCNC: 11 MMOL/L
AST SERPL-CCNC: 21 U/L
BASOPHILS # BLD AUTO: 0.04 K/UL
BASOPHILS NFR BLD AUTO: 0.7 %
BILIRUB DIRECT SERPL-MCNC: 0.2 MG/DL
BILIRUB INDIRECT SERPL-MCNC: 0.3 MG/DL
BILIRUB SERPL-MCNC: 0.5 MG/DL
BUN SERPL-MCNC: 22 MG/DL
CALCIUM SERPL-MCNC: 9.6 MG/DL
CHLORIDE SERPL-SCNC: 105 MMOL/L
CHOLEST SERPL-MCNC: 139 MG/DL
CK SERPL-CCNC: 119 U/L
CO2 SERPL-SCNC: 24 MMOL/L
CREAT SERPL-MCNC: 1.47 MG/DL
CREAT SPEC-SCNC: 36 MG/DL
EGFR: 45 ML/MIN/1.73M2
EOSINOPHIL # BLD AUTO: 0.18 K/UL
EOSINOPHIL NFR BLD AUTO: 3.2 %
ESTIMATED AVERAGE GLUCOSE: 126 MG/DL
GLUCOSE SERPL-MCNC: 75 MG/DL
HBA1C MFR BLD HPLC: 6 %
HCT VFR BLD CALC: 35.6 %
HDLC SERPL-MCNC: 51 MG/DL
HGB BLD-MCNC: 11.8 G/DL
IMM GRANULOCYTES NFR BLD AUTO: 0.2 %
LDLC SERPL CALC-MCNC: 74 MG/DL
LDLC SERPL DIRECT ASSAY-MCNC: 80 MG/DL
LYMPHOCYTES # BLD AUTO: 1.31 K/UL
LYMPHOCYTES NFR BLD AUTO: 23.6 %
MAGNESIUM SERPL-MCNC: 2 MG/DL
MAN DIFF?: NORMAL
MCHC RBC-ENTMCNC: 33.1 GM/DL
MCHC RBC-ENTMCNC: 33.2 PG
MCV RBC AUTO: 100.3 FL
MICROALBUMIN 24H UR DL<=1MG/L-MCNC: 5.5 MG/DL
MICROALBUMIN/CREAT 24H UR-RTO: 153 MG/G
MONOCYTES # BLD AUTO: 0.65 K/UL
MONOCYTES NFR BLD AUTO: 11.7 %
NEUTROPHILS # BLD AUTO: 3.37 K/UL
NEUTROPHILS NFR BLD AUTO: 60.6 %
NONHDLC SERPL-MCNC: 88 MG/DL
NT-PROBNP SERPL-MCNC: 1922 PG/ML
PHOSPHATE SERPL-MCNC: 3 MG/DL
PLATELET # BLD AUTO: 136 K/UL
POTASSIUM SERPL-SCNC: 3.9 MMOL/L
PROT SERPL-MCNC: 6.9 G/DL
RBC # BLD: 3.55 M/UL
RBC # FLD: 13.3 %
SODIUM SERPL-SCNC: 141 MMOL/L
T4 FREE SERPL-MCNC: 1.1 NG/DL
TRIGL SERPL-MCNC: 71 MG/DL
TSH SERPL-ACNC: 4.81 UIU/ML
URATE SERPL-MCNC: 5 MG/DL
WBC # FLD AUTO: 5.56 K/UL

## 2023-04-21 NOTE — HISTORY OF PRESENT ILLNESS
[FreeTextEntry1] : Vini is a 89 y.o male, accompanied by wife Noemi, w/MHx of CVA, CAD s/p PCI [place], CHF, pAfib, HLD, HTN, Hypothyroid, who presents for routine follow up. \par The patient presents for routine follow up of their coronary artery disease. The patient has been following all secondary prevents measures and antiplatelet therapy. The patient denies shortness of breath, chest pain, dizziness, palpitations, easy bruising/bleeding/hematuria/blood in stool.\par The patient is here for follow-up of congestive heart failure.  The patient states that they're taking their medications reliably and have not noticed any serious weight gain since the last visit.\par The patient is also here for follow-up of atrial fibrillation. Currently they feel well with an occasional episode of palpitations.  The patient states they are reliably taking anticoagulation medication. Denies signs of bleeding, dizziness, headaches, nosebleeds or black tarry stools. \par The patient is here for follow-up of elevated cholesterol. Currently tolerating prescribed medications. Denies muscle pain, joint pain, back pain, urinary changes, nausea, vomiting, abdominal pain or diarrhea. The patient is trying to follow a low cholesterol diet.\par The patient is here for evaluation of high blood pressure. Currently tolerating antihypertensive medications. Denies headaches, stiff neck, visual changes, or PND. The patient has been trying to stay on a low-sodium diet.

## 2023-04-21 NOTE — CARDIOLOGY SUMMARY
[de-identified] : 4/19/2023 LVEF 55-60%, grade ii dd, mild LVH, mild LAE, mild MR, mild TR, mild to moderate AI, trace AZ.\par 4/21/2022 LVEF 50-55%, mild concentric LVH,  mild LAE, mild MR, mild TR, mild to moderate AI, trace AZ.

## 2023-09-05 ENCOUNTER — NON-APPOINTMENT (OUTPATIENT)
Age: 88
End: 2023-09-05

## 2023-09-05 ENCOUNTER — APPOINTMENT (OUTPATIENT)
Dept: CARDIOLOGY | Facility: CLINIC | Age: 88
End: 2023-09-05
Payer: MEDICARE

## 2023-09-05 VITALS
HEART RATE: 77 BPM | SYSTOLIC BLOOD PRESSURE: 120 MMHG | TEMPERATURE: 98 F | HEIGHT: 72 IN | DIASTOLIC BLOOD PRESSURE: 65 MMHG | OXYGEN SATURATION: 99 %

## 2023-09-05 DIAGNOSIS — D64.9 ANEMIA, UNSPECIFIED: ICD-10-CM

## 2023-09-05 DIAGNOSIS — I10 ESSENTIAL (PRIMARY) HYPERTENSION: ICD-10-CM

## 2023-09-05 DIAGNOSIS — E03.9 HYPOTHYROIDISM, UNSPECIFIED: ICD-10-CM

## 2023-09-05 DIAGNOSIS — I50.9 HEART FAILURE, UNSPECIFIED: ICD-10-CM

## 2023-09-05 DIAGNOSIS — R73.03 PREDIABETES.: ICD-10-CM

## 2023-09-05 DIAGNOSIS — I25.10 ATHEROSCLEROTIC HEART DISEASE OF NATIVE CORONARY ARTERY W/OUT ANGINA PECTORIS: ICD-10-CM

## 2023-09-05 DIAGNOSIS — R60.0 LOCALIZED EDEMA: ICD-10-CM

## 2023-09-05 DIAGNOSIS — E78.5 HYPERLIPIDEMIA, UNSPECIFIED: ICD-10-CM

## 2023-09-05 DIAGNOSIS — S81.802A UNSPECIFIED OPEN WOUND, LEFT LOWER LEG, INITIAL ENCOUNTER: ICD-10-CM

## 2023-09-05 DIAGNOSIS — I48.91 UNSPECIFIED ATRIAL FIBRILLATION: ICD-10-CM

## 2023-09-05 PROCEDURE — 99214 OFFICE O/P EST MOD 30 MIN: CPT

## 2023-09-05 PROCEDURE — 93000 ELECTROCARDIOGRAM COMPLETE: CPT

## 2023-09-05 RX ORDER — DOXYCYCLINE 100 MG/1
100 CAPSULE ORAL TWICE DAILY
Qty: 14 | Refills: 0 | Status: ACTIVE | COMMUNITY
Start: 2023-09-05 | End: 1900-01-01

## 2023-09-05 NOTE — HISTORY OF PRESENT ILLNESS
[FreeTextEntry1] : Vini is a 89 y.o male, accompanied by wife Noemi, w/MHx of CVA, CAD s/p PCI [place], CHF, pAfib, HLD, HTN, Hypothyroid, who presents for routine follow up.  The patient presents for routine follow up of their coronary artery disease. The patient has been following all secondary prevents measures and antiplatelet therapy. The patient denies shortness of breath, chest pain, dizziness, palpitations, easy bruising/bleeding/hematuria/blood in stool.  The patient is here for follow-up of congestive heart failure. The patient states that they're taking their medications reliably and have not noticed any serious weight gain since the last visit.  The patient is also here for follow-up of atrial fibrillation. Currently they feel well with an occasional episode of palpitations. The patient states they are reliably taking anticoagulation medication. Denies signs of bleeding, dizziness, headaches, nosebleeds or black tarry stools.  The patient is here for follow-up of elevated cholesterol. Currently tolerating prescribed medications. Denies muscle pain, joint pain, back pain, urinary changes, nausea, vomiting, abdominal pain or diarrhea. The patient is trying to follow a low cholesterol diet.  The patient is here for evaluation of high blood pressure. Currently tolerating antihypertensive medications. Denies headaches, stiff neck, visual changes, or PND. The patient has been trying to stay on a low-sodium diet. Patient states that he has not been monitoring his blood pressure at home.   Patient c/o of a left lateral lower leg wound. He states that he scraped it on the edge of a window frame approximately 4 days ago. He states that he covered it with a band aid yesterday.

## 2023-09-06 LAB
ALBUMIN SERPL ELPH-MCNC: 4.1 G/DL
ALP BLD-CCNC: 75 U/L
ALT SERPL-CCNC: 11 U/L
ANION GAP SERPL CALC-SCNC: 11 MMOL/L
APO B SERPL-MCNC: 66 MG/DL
AST SERPL-CCNC: 19 U/L
BILIRUB DIRECT SERPL-MCNC: 0.1 MG/DL
BILIRUB INDIRECT SERPL-MCNC: 0.4 MG/DL
BILIRUB SERPL-MCNC: 0.5 MG/DL
BUN SERPL-MCNC: 30 MG/DL
CALCIUM SERPL-MCNC: 9.7 MG/DL
CHLORIDE SERPL-SCNC: 104 MMOL/L
CHOLEST SERPL-MCNC: 131 MG/DL
CK SERPL-CCNC: 85 U/L
CO2 SERPL-SCNC: 26 MMOL/L
CREAT SERPL-MCNC: 1.74 MG/DL
CRP SERPL HS-MCNC: 8.15 MG/L
EGFR: 37 ML/MIN/1.73M2
ESTIMATED AVERAGE GLUCOSE: 123 MG/DL
GLUCOSE SERPL-MCNC: 95 MG/DL
HBA1C MFR BLD HPLC: 5.9 %
HCT VFR BLD CALC: 35.9 %
HDLC SERPL-MCNC: 44 MG/DL
HGB BLD-MCNC: 11.7 G/DL
LDLC SERPL CALC-MCNC: 73 MG/DL
LDLC SERPL DIRECT ASSAY-MCNC: 78 MG/DL
MCHC RBC-ENTMCNC: 32.6 GM/DL
MCHC RBC-ENTMCNC: 32.9 PG
MCV RBC AUTO: 100.8 FL
NONHDLC SERPL-MCNC: 87 MG/DL
NT-PROBNP SERPL-MCNC: 1549 PG/ML
PLATELET # BLD AUTO: 148 K/UL
POTASSIUM SERPL-SCNC: 5 MMOL/L
PROT SERPL-MCNC: 6.8 G/DL
RBC # BLD: 3.56 M/UL
RBC # FLD: 13.2 %
SODIUM SERPL-SCNC: 140 MMOL/L
TRIGL SERPL-MCNC: 69 MG/DL
WBC # FLD AUTO: 6.97 K/UL

## 2023-09-14 ENCOUNTER — APPOINTMENT (OUTPATIENT)
Dept: CARDIOLOGY | Facility: CLINIC | Age: 88
End: 2023-09-14

## 2023-10-09 ENCOUNTER — MED ADMIN CHARGE (OUTPATIENT)
Age: 88
End: 2023-10-09

## 2023-11-15 RX ORDER — RIVAROXABAN 15 MG/1
15 TABLET, FILM COATED ORAL
Qty: 90 | Refills: 1 | Status: ACTIVE | COMMUNITY
Start: 2022-04-14 | End: 1900-01-01

## 2024-03-23 ENCOUNTER — RX RENEWAL (OUTPATIENT)
Age: 89
End: 2024-03-23

## 2024-03-23 RX ORDER — LOSARTAN POTASSIUM AND HYDROCHLOROTHIAZIDE 12.5; 5 MG/1; MG/1
50-12.5 TABLET ORAL DAILY
Qty: 90 | Refills: 1 | Status: ACTIVE | COMMUNITY
Start: 2021-01-13 | End: 1900-01-01

## 2024-06-25 ENCOUNTER — RX RENEWAL (OUTPATIENT)
Age: 89
End: 2024-06-25

## 2024-06-25 RX ORDER — AMLODIPINE BESYLATE 5 MG/1
5 TABLET ORAL
Qty: 90 | Refills: 0 | Status: ACTIVE | COMMUNITY
Start: 2019-06-14 | End: 1900-01-01

## 2025-03-07 NOTE — DISCHARGE NOTE ADULT - BECAUSE OF A PHYSICAL, MENTAL OR EMOTIONAL CONDITION, DO YOU HAVE DIFFICULTY DOING  ERRANDS ALONE LIKE VISITING A DOCTOR'S OFFICE OR SHOPPING (15 YEARS AND OLDER)
level to feel your breast tissue before moving on to the next spot.  Check the entire breast area and armpit.  Repeat this procedure for your left breast, using the pads of the 3 middle fingers of your right hand.  To check your breasts while in the shower:  Place one arm over your head, and lightly soap your breast on that side.  Using the pads of your fingers, gently move your hand over the entire breast area and armpit, feeling carefully for any lumps or changes.  Repeat for the other breast.  Have your doctor check anything you notice to see if you need further testing.  Where can you learn more?  Go to https://www.[employer].net/patientEd and enter P148 to learn more about \"Breast Self-Exam: Care Instructions.\"  Current as of: April 30, 2024  Content Version: 14.3  © 2024 [employer].   Care instructions adapted under license by [employer]. If you have questions about a medical condition or this instruction, always ask your healthcare professional. [employer] disclaims any warranty or liability for your use of this information.        No